# Patient Record
Sex: FEMALE | Race: WHITE | NOT HISPANIC OR LATINO | Employment: PART TIME | URBAN - NONMETROPOLITAN AREA
[De-identification: names, ages, dates, MRNs, and addresses within clinical notes are randomized per-mention and may not be internally consistent; named-entity substitution may affect disease eponyms.]

---

## 2017-02-15 ENCOUNTER — TRANSFERRED RECORDS (OUTPATIENT)
Dept: HEALTH INFORMATION MANAGEMENT | Facility: HOSPITAL | Age: 32
End: 2017-02-15

## 2017-02-22 ENCOUNTER — OFFICE VISIT (OUTPATIENT)
Dept: SLEEP MEDICINE | Facility: HOSPITAL | Age: 32
End: 2017-02-22
Attending: INTERNAL MEDICINE
Payer: COMMERCIAL

## 2017-02-22 VITALS
OXYGEN SATURATION: 99 % | SYSTOLIC BLOOD PRESSURE: 138 MMHG | DIASTOLIC BLOOD PRESSURE: 90 MMHG | WEIGHT: 160 LBS | BODY MASS INDEX: 28.35 KG/M2 | HEART RATE: 78 BPM | RESPIRATION RATE: 16 BRPM | HEIGHT: 63 IN

## 2017-02-22 DIAGNOSIS — G47.33 SLEEP APNEA, OBSTRUCTIVE: Primary | ICD-10-CM

## 2017-02-22 PROCEDURE — 99213 OFFICE O/P EST LOW 20 MIN: CPT

## 2017-02-22 PROCEDURE — 99241 ZZC OFFICE CONSULTATION,LEVEL I: CPT | Performed by: INTERNAL MEDICINE

## 2017-02-22 ASSESSMENT — ENCOUNTER SYMPTOMS
INSOMNIA: 1
HEADACHES: 0

## 2017-02-22 NOTE — MR AVS SNAPSHOT
After Visit Summary   2/22/2017    Eugenie Aguero    MRN: [id number]           Patient Information     Date Of Birth          1985        Visit Information        Provider Department      2/22/2017 1:30 PM Earl Hayes MD [facility]        Care Instructions    Overnight Sleep Study    In order to help your stay at the Sleep Center to be as comfortable as possible and to obtain the best sleep study possible, the Sleep Center Staff has established the following guidelines:    1.  Please attempt to be here 15 minutes prior to your scheduled appointment time.  If you anticipate being late or you cannot make your overnight appointment, please call us at [phone] or call [phone] and ask for the Sleep Center.  PLEASE MAKE EVERY ATTEMPT TO MAKE YOUR APPOINTMENT.  A SLEEP TECHNICIAN AND A SLEEP ROOM HAS BEEN RESERVED JUST FOR YOU.    2. When you arrive at [facility], please park in the upper lot, by [address].  Enter the hospital at the Emergency Room Entrance.  Follow the signs to the Emergency Room Admitting Desk and tell them you are here for a sleep study in the Sleep Disorder Center.    3. Do not stop any medications, unless specifically requested.  Be sure to bring all medications that you need with you.    4. Do not use any hair creams or gels, moisturizers, rinses or sprays the day of your study.  FOR MALES:  if you are usually clean shaven, please shave before you come in; FOR FEMALES:  do not wear make-up or be prepared to remove it.  This will improve the quality of the study.    5. Please DO NOT USE CAFFEINE OR ALCOHOL after 2:00 PM the day of your test unless advised otherwise.    6. Do not take any naps the day of your test.    7. Attachment of monitoring equipment will take approximately one hour, including an explanation of the test.    8. Bring comfortable night clothes to sleep in, two-piece, cotton pajamas or shorts are best.    9. If you have a pillow  "that you prefer using, please bring it with you; but do not forget to take it home with you in the morning.    10. Most of our studies are complete by approximately 7:00 AM.  In most instances we may wake you during your normal wake time or the time you request to be awakened.    If you have any special needs or questions related to this information or your test, please feel free to call the Sleep Disorder Center at [phone] or [phone] and ask for the Sleep Disorder Center.  Please make every effort to keep your appointment.  A sleep technician and a sleep room have been reserved just for you.  In the event that you are unable to make your appointment, please call as soon as possible to notify us of your cancellation.        Follow-ups after your visit        Who to contact     If you have questions or need follow up information about today's clinic visit or your schedule please contact [facility] directly at [phone].  Normal or non-critical lab and imaging results will be communicated to you by [employer], letter or phone within 4 business days after the clinic has received the results. If you do not hear from us within 7 days, please contact the clinic through [employer] or phone. If you have a critical or abnormal lab result, we will notify you by phone as soon as possible.  Submit refill requests through [employer] or call your pharmacy and they will forward the refill request to us. Please allow 3 business days for your refill to be completed.          Additional Information About Your Visit        [employer] Information     [employer] lets you send messages to your doctor, view your test results, renew your prescriptions, schedule appointments and more. To sign up, go to [email or web address] . Click on \"Log in\" on the left side of the screen, which will take you to the Welcome page. Then click on \"Sign up Now\" on the right side of the page.     You will be asked to enter the access code listed below, as well as " "some personal information. Please follow the directions to create your username and password.     Your access code is: [email or web address]  Expires: 2017  2:06 PM     Your access code will  in 90 days. If you need help or a new code, please call your [place] clinic or [phone].        Care EveryWhere ID     This is your Care EveryWhere ID. This could be used by other organizations to access your [place] medical records  [id number]        Your Vitals Were     Pulse Respirations Height Pulse Oximetry BMI (Body Mass Index)       78 16 5' 3\" (1.6 m) 99% 28.34 kg/m2        Blood Pressure from Last 3 Encounters:   17 138/90   16 121/85   11/04/15 111/80    Weight from Last 3 Encounters:   17 160 lb (72.6 kg)   11/04/15 182 lb 12.8 oz (82.9 kg)   11/03/15 184 lb (83.5 kg)              Today, you had the following     No orders found for display       Primary Care Provider Office Phone # Fax #    Tiarra DIGNA Rao [phone]       [facility] [place] [address] [facility] [address]        Thank you!     Thank you for choosing [facility]  for your care. Our goal is always to provide you with excellent care. Hearing back from our patients is one way we can continue to improve our services. Please take a few minutes to complete the written survey that you may receive in the mail after your visit with us. Thank you!             Your Updated Medication List - Protect others around you: Learn how to safely use, store and throw away your medicines at www.disposemymeds.org.          This list is accurate as of: 17  2:06 PM.  Always use your most recent med list.                   Brand Name Dispense Instructions for use    NAPROXEN PO      Take 500 mg by mouth 2 times daily as needed for moderate pain       ZOLOFT PO      Take 100 mg by mouth daily         "

## 2017-02-22 NOTE — PROGRESS NOTES
Roomed patient, verified ID by name and .  Took vitals and brief history.  Reviewed medications, allergies and smoking history.  Explained overnight testing procedure with patient having a good understanding.

## 2017-02-22 NOTE — PROGRESS NOTES
"HPI Comments: 30 y/o referred by Tiarra Rao with [facility]. Pts BF says she snores loudly and regularly quits breathing at night, she does snore herself awake. Sleep hours are 11 to 9, wakes frequently. No AM HAs, no RLS. She is quite sleepy during the day but tries not to nap. Weight is modestly up. Has trouble with chronic sinus congestion.    PMH migraine HA, allergic/perennial rhinitis    SH recently left a job, has a BF        Review of Systems   Constitutional: Positive for malaise/fatigue.   Neurological: Negative for headaches.   Psychiatric/Behavioral: The patient has insomnia.          Physical Exam   Constitutional: She is oriented to person, place, and time and well-developed, well-nourished, and in no distress.   HENT:   Head: Normocephalic.   Mouth/Throat: Oropharynx is clear and moist.   Narrowed pharynx, full ant neck   Cardiovascular: Normal rate.    Pulmonary/Chest: Effort normal.   Neurological: She is alert and oriented to person, place, and time. Gait normal.   Skin: Skin is warm and dry.   Psychiatric: Memory, affect and judgment normal.     /90  Pulse 78  Resp 16  Ht 5' 3\" (1.6 m)  Wt 160 lb (72.6 kg)  SpO2 99%  BMI 28.34 kg/m2     Current Outpatient Prescriptions:      Sertraline HCl (ZOLOFT PO), Take 100 mg by mouth daily, Disp: , Rfl:      NAPROXEN PO, Take 500 mg by mouth 2 times daily as needed for moderate pain, Disp: , Rfl:      A/ [facility]  Study ordered.      "

## 2017-02-22 NOTE — PATIENT INSTRUCTIONS
Overnight Sleep Study    In order to help your stay at the Sleep Center to be as comfortable as possible and to obtain the best sleep study possible, the Sleep Center Staff has established the following guidelines:    1.  Please attempt to be here 15 minutes prior to your scheduled appointment time.  If you anticipate being late or you cannot make your overnight appointment, please call us at [phone] or call [phone] and ask for the Sleep Center.  PLEASE MAKE EVERY ATTEMPT TO MAKE YOUR APPOINTMENT.  A SLEEP TECHNICIAN AND A SLEEP ROOM HAS BEEN RESERVED JUST FOR YOU.    2. When you arrive at [facility], please park in the upper lot, by [address].  Enter the hospital at the Emergency Room Entrance.  Follow the signs to the Emergency Room Admitting Desk and tell them you are here for a sleep study in the Sleep Disorder Center.    3. Do not stop any medications, unless specifically requested.  Be sure to bring all medications that you need with you.    4. Do not use any hair creams or gels, moisturizers, rinses or sprays the day of your study.  FOR MALES:  if you are usually clean shaven, please shave before you come in; FOR FEMALES:  do not wear make-up or be prepared to remove it.  This will improve the quality of the study.    5. Please DO NOT USE CAFFEINE OR ALCOHOL after 2:00 PM the day of your test unless advised otherwise.    6. Do not take any naps the day of your test.    7. Attachment of monitoring equipment will take approximately one hour, including an explanation of the test.    8. Bring comfortable night clothes to sleep in, two-piece, cotton pajamas or shorts are best.    9. If you have a pillow that you prefer using, please bring it with you; but do not forget to take it home with you in the morning.    10. Most of our studies are complete by approximately 7:00 AM.  In most instances we may wake you during your normal wake time or the time you request to be awakened.    If you have  any special needs or questions related to this information or your test, please feel free to call the Sleep Disorder Center at [phone] or [phone] and ask for the Sleep Disorder Center.  Please make every effort to keep your appointment.  A sleep technician and a sleep room have been reserved just for you.  In the event that you are unable to make your appointment, please call as soon as possible to notify us of your cancellation.

## 2017-02-25 ENCOUNTER — HOSPITAL ENCOUNTER (EMERGENCY)
Facility: HOSPITAL | Age: 32
Discharge: HOME | End: 2017-02-25
Attending: FAMILY MEDICINE | Admitting: FAMILY MEDICINE
Payer: COMMERCIAL

## 2017-02-25 VITALS
OXYGEN SATURATION: 97 % | HEART RATE: 99 BPM | TEMPERATURE: 98.1 F | SYSTOLIC BLOOD PRESSURE: 134 MMHG | DIASTOLIC BLOOD PRESSURE: 109 MMHG | RESPIRATION RATE: 16 BRPM

## 2017-02-25 DIAGNOSIS — G51.0 BELL'S PALSY: ICD-10-CM

## 2017-02-25 LAB
ALBUMIN SERPL-MCNC: 3.6 G/DL (ref 3.4–5)
ALP SERPL-CCNC: 99 U/L (ref 40–150)
ALT SERPL W P-5'-P-CCNC: 37 U/L (ref 0–50)
ANION GAP SERPL CALCULATED.3IONS-SCNC: 8 MMOL/L (ref 3–14)
AST SERPL W P-5'-P-CCNC: 16 U/L (ref 0–45)
BASOPHILS # BLD AUTO: 0 10E9/L (ref 0–0.2)
BASOPHILS NFR BLD AUTO: 0.5 %
BILIRUB SERPL-MCNC: 0.3 MG/DL (ref 0.2–1.3)
BUN SERPL-MCNC: 15 MG/DL (ref 7–30)
CALCIUM SERPL-MCNC: 8.9 MG/DL (ref 8.5–10.1)
CHLORIDE SERPL-SCNC: 105 MMOL/L (ref 94–109)
CO2 SERPL-SCNC: 27 MMOL/L (ref 20–32)
CREAT SERPL-MCNC: 0.81 MG/DL (ref 0.52–1.04)
DIFFERENTIAL METHOD BLD: NORMAL
EOSINOPHIL # BLD AUTO: 0.6 10E9/L (ref 0–0.7)
EOSINOPHIL NFR BLD AUTO: 6.7 %
ERYTHROCYTE [DISTWIDTH] IN BLOOD BY AUTOMATED COUNT: 13.3 % (ref 10–15)
ERYTHROCYTE [SEDIMENTATION RATE] IN BLOOD BY WESTERGREN METHOD: 21 MM/H (ref 0–20)
GFR SERPL CREATININE-BSD FRML MDRD: 83 ML/MIN/1.7M2
GLUCOSE SERPL-MCNC: 94 MG/DL (ref 70–99)
HCT VFR BLD AUTO: 39 % (ref 35–47)
HGB BLD-MCNC: 13.3 G/DL (ref 11.7–15.7)
IMM GRANULOCYTES # BLD: 0 10E9/L (ref 0–0.4)
IMM GRANULOCYTES NFR BLD: 0.2 %
LYMPHOCYTES # BLD AUTO: 2.9 10E9/L (ref 0.8–5.3)
LYMPHOCYTES NFR BLD AUTO: 34.9 %
MCH RBC QN AUTO: 30.2 PG (ref 26.5–33)
MCHC RBC AUTO-ENTMCNC: 34.1 G/DL (ref 31.5–36.5)
MCV RBC AUTO: 89 FL (ref 78–100)
MONOCYTES # BLD AUTO: 0.6 10E9/L (ref 0–1.3)
MONOCYTES NFR BLD AUTO: 6.8 %
NEUTROPHILS # BLD AUTO: 4.2 10E9/L (ref 1.6–8.3)
NEUTROPHILS NFR BLD AUTO: 50.9 %
NRBC # BLD AUTO: 0 10*3/UL
NRBC BLD AUTO-RTO: 0 /100
PLATELET # BLD AUTO: 248 10E9/L (ref 150–450)
POTASSIUM SERPL-SCNC: 3.8 MMOL/L (ref 3.4–5.3)
PROT SERPL-MCNC: 7.3 G/DL (ref 6.8–8.8)
RBC # BLD AUTO: 4.4 10E12/L (ref 3.8–5.2)
SODIUM SERPL-SCNC: 140 MMOL/L (ref 133–144)
WBC # BLD AUTO: 8.2 10E9/L (ref 4–11)

## 2017-02-25 PROCEDURE — 80053 COMPREHEN METABOLIC PANEL: CPT | Performed by: FAMILY MEDICINE

## 2017-02-25 PROCEDURE — 85025 COMPLETE CBC W/AUTO DIFF WBC: CPT | Performed by: FAMILY MEDICINE

## 2017-02-25 PROCEDURE — 99283 EMERGENCY DEPT VISIT LOW MDM: CPT

## 2017-02-25 PROCEDURE — 36415 COLL VENOUS BLD VENIPUNCTURE: CPT | Performed by: FAMILY MEDICINE

## 2017-02-25 PROCEDURE — 99283 EMERGENCY DEPT VISIT LOW MDM: CPT | Performed by: FAMILY MEDICINE

## 2017-02-25 PROCEDURE — 85652 RBC SED RATE AUTOMATED: CPT | Performed by: FAMILY MEDICINE

## 2017-02-25 RX ORDER — ETHYL ALCOHOL 700 MG/ML
0.25 GEL TOPICAL 3 TIMES DAILY PRN
Qty: 1 TUBE | Refills: 0 | Status: SHIPPED | OUTPATIENT
Start: 2017-02-25 | End: 2017-03-02

## 2017-02-25 RX ORDER — PREDNISONE 20 MG/1
TABLET ORAL
Qty: 9 TABLET | Refills: 0 | Status: SHIPPED
Start: 2017-02-25 | End: 2018-12-09

## 2017-02-25 RX ORDER — VALACYCLOVIR HYDROCHLORIDE 1 G/1
500 TABLET, FILM COATED ORAL 3 TIMES DAILY
Qty: 8 TABLET | Refills: 0 | Status: SHIPPED | OUTPATIENT
Start: 2017-02-25 | End: 2018-12-09

## 2017-02-25 NOTE — ED AVS SNAPSHOT
[facility]    [address]    [place] [address]    Phone:  [phone]                                       Eugenie Aguero   MRN: [id number]    Department:  [facility]   Date of Visit:  2/25/2017           After Visit Summary Signature Page     I have received my discharge instructions, and my questions have been answered. I have discussed any challenges I see with this plan with the nurse or doctor.    ..........................................................................................................................................  Patient/Patient Representative Signature      ..........................................................................................................................................  Patient Representative Print Name and Relationship to Patient    ..................................................               ................................................  Date                                            Time    ..........................................................................................................................................  Reviewed by Signature/Title    ...................................................              ..............................................  Date                                                            Time

## 2017-02-25 NOTE — DISCHARGE INSTRUCTIONS
Tan s Palsy  Bell's Palsy is a problem involving the nerve that controls the muscles on one side of the face.    The cause is unknown, but may be related to inflammation of the nerve. Symptoms usually appear only on the affected side. They may include:    Inability to close the eyelid    Tearing of the eye    Facial drooping    Drooling    Numbness or pain    Changes in taste    Sound sensitivity  Damage to the eye can be a serious problem. The inability to blink can cause the eye to dry out. An ulcer (sore) can then form on the cornea. Also, not blinking means that the eye has no protection from dirt and dust particles.   Treatment involves protecting and moistening the eye. Medicines may also help.  Most persons recover completely within 3 to 6 months. However, the condition sometimes returns months or years later.  Home care    Get plenty of rest and eat a healthy diet to help yourself recover.    Use Artificial Tears frequently during the day and at bedtime to prevent drying. These drops are available without prescription at your drug store.    Wear protective glasses especially when outside to protect from flying debris. Use sunglasses when outdoors.    Tape the eyelid closed at bedtime with a paper tape (available at your pharmacy). This has a very mild adhesive to avoid injury to the lid. This will protect your eye from injury while you sleep.    Sometimes medicines are prescribed to reduce inflammation or treat specific viral infections of the nerve. If medicines are prescribed, take them exactly as directed.  Usually there is a 10-day course of medication that is started as soon as possible.  Taking this medicine as prescribed will help with a full recovery.      Use low heat, for example from a heating pad, on the affected area.  This can help reduce pain and swelling.      If you are experiencing sever pain, contact your health care provider.  Follow-up care  Follow up with your healthcare provider as  advised. If you referred to a specialist, make that appointment promptly.  When to seek medical advice  Call your healthcare provider if any of the following occur:    Severe eye redness    Eye pain    Thick drainage from the eye    Change in vision (such as double vision or losing vision)    Fever over 100.4 F (38 C) or as directed by your healthcare provider    Headache, neck pain, weakness, trouble speaking or walking, or other unexplained symptoms    2263-0532 [employer]. [address] [place] [address], [place] [address]. All rights reserved. This information is not intended as a substitute for professional medical care. Always follow your healthcare professional's instructions.

## 2017-02-25 NOTE — ED NOTES
"Pt ambulatory to room 1 of the ED. Pt has right sided facial weakness since today and right eye doesn't close all of the way and is dry. A few days ago the right side of tongue felt numb and \"burnt.\" Right ear has been sound sensitive. Left side of mouth not working correctly. Pt talking appropriately. Pt a/o. Denies pain. Pt gowned. Call light in reach.  "

## 2017-02-25 NOTE — ED NOTES
Spoke with TODD Mason regarding patient's status. Patient states last night she felt like the right side of her face was weaker, she was unable to fully shut R eye which required eye drops throughout the night. FAST in triage positive for R sided droop. No other neuro symptoms - no slurred speech. Alert and oriented - able to lift eyebrows. All extremities move. Patient denies pain, fever, chills or other complaints.

## 2017-02-25 NOTE — ED AVS SNAPSHOT
[facility]    [address] [place] [address]    Phone:  [phone]                                       Eugenie gAuero   MRN: [id number]    Department:  [facility]   Date of Visit:  2/25/2017           Patient Information     Date Of Birth          1985        Your diagnoses for this visit were:     Bell's palsy        You were seen by Joelle Lundberg MD.      Follow-up Information     Follow up with Tiarra Rao NP In 1 week.    Contact information:    [facility] [place]  [address] [place] [address]  [phone]          Discharge Instructions         Bell s Palsy  Bell's Palsy is a problem involving the nerve that controls the muscles on one side of the face.    The cause is unknown, but may be related to inflammation of the nerve. Symptoms usually appear only on the affected side. They may include:    Inability to close the eyelid    Tearing of the eye    Facial drooping    Drooling    Numbness or pain    Changes in taste    Sound sensitivity  Damage to the eye can be a serious problem. The inability to blink can cause the eye to dry out. An ulcer (sore) can then form on the cornea. Also, not blinking means that the eye has no protection from dirt and dust particles.   Treatment involves protecting and moistening the eye. Medicines may also help.  Most persons recover completely within 3 to 6 months. However, the condition sometimes returns months or years later.  Home care    Get plenty of rest and eat a healthy diet to help yourself recover.    Use Artificial Tears frequently during the day and at bedtime to prevent drying. These drops are available without prescription at your drug store.    Wear protective glasses especially when outside to protect from flying debris. Use sunglasses when outdoors.    Tape the eyelid closed at bedtime with a paper tape (available at your pharmacy). This has a very mild adhesive to avoid injury to the lid. This will  protect your eye from injury while you sleep.    Sometimes medicines are prescribed to reduce inflammation or treat specific viral infections of the nerve. If medicines are prescribed, take them exactly as directed.  Usually there is a 10-day course of medication that is started as soon as possible.  Taking this medicine as prescribed will help with a full recovery.      Use low heat, for example from a heating pad, on the affected area.  This can help reduce pain and swelling.      If you are experiencing sever pain, contact your health care provider.  Follow-up care  Follow up with your healthcare provider as advised. If you referred to a specialist, make that appointment promptly.  When to seek medical advice  Call your healthcare provider if any of the following occur:    Severe eye redness    Eye pain    Thick drainage from the eye    Change in vision (such as double vision or losing vision)    Fever over 100.4 F (38 C) or as directed by your healthcare provider    Headache, neck pain, weakness, trouble speaking or walking, or other unexplained symptoms    7371-8476 [employer]. [address] [place] [address]. All rights reserved. This information is not intended as a substitute for professional medical care. Always follow your healthcare professional's instructions.          Future Appointments        Provider Department Dept Phone Center    3/13/2017 8:30 PM [facility] [phone] [facility]         Review of your medicines      START taking        Dose / Directions Last dose taken    EYE LUBRICANT Oint   Dose:  0.25 inch   Quantity:  1 Tube        Apply 0.25 inches to eye 3 times daily as needed   Refills:  0        predniSONE 20 MG tablet   Commonly known as:  DELTASONE   Quantity:  9 tablet        Take 4 tablets (40 mg) twice a day for 5 days, then 2 tablets (20 mg) twice a day for 2 days, then 1 tablet (10 mg) a day for 2 days, then 1/2 tablet for 1 day,  then off   Refills:  0        valACYclovir 1000 mg tablet   Commonly known as:  VALTREX   Dose:  500 mg   Quantity:  8 tablet        Take 0.5 tablets (500 mg) by mouth 3 times daily for 5 days   Refills:  0          Our records show that you are taking the medicines listed below. If these are incorrect, please call your family doctor or clinic.        Dose / Directions Last dose taken    IBUPROFEN PO   Dose:  600 mg        Take 600 mg by mouth every 6 hours as needed for moderate pain   Refills:  0        IMITREX PO   Dose:  100 mg        Take 100 mg by mouth every 8 hours as needed for migraine   Refills:  0        LEXAPRO PO   Dose:  10 mg        Take 10 mg by mouth daily   Refills:  0        PROPRANOLOL HCL PO   Dose:  80 mg        Take 80 mg by mouth daily   Refills:  0                Prescriptions were sent or printed at these locations (3 Prescriptions)                   [address] [address]    Telephone:  [phone]   Fax:  [phone]   Hours:                  E-Prescribed (2 of 3)         valACYclovir (VALTREX) 1000 mg tablet               Artificial Tear Ointment (EYE LUBRICANT) OINT                 Faxed (1 of 3)         predniSONE (DELTASONE) 20 MG tablet                Procedures and tests performed during your visit     CBC with platelets differential    Comprehensive metabolic panel    Erythrocyte sedimentation rate auto      Orders Needing Specimen Collection     None      Pending Results     No orders found from 2/23/2017 to 2/26/2017.            Pending Culture Results     No orders found from 2/23/2017 to 2/26/2017.            Thank you for choosing [facility]       Thank you for choosing [facility] for your care. Our goal is always to provide you with excellent care. Hearing back from our patients is one way we can continue to improve our services. Please take a few minutes to  "complete the written survey that you may receive in the mail after you visit with us. Thank you!        [employer] Information     [employer] lets you send messages to your doctor, view your test results, renew your prescriptions, schedule appointments and more. To sign up, go to [email or web address] . Click on \"Log in\" on the left side of the screen, which will take you to the Welcome page. Then click on \"Sign up Now\" on the right side of the page.     You will be asked to enter the access code listed below, as well as some personal information. Please follow the directions to create your username and password.     Your access code is: [email or web address]  Expires: 2017  2:06 PM     Your access code will  in 90 days. If you need help or a new code, please call your [place] clinic or [phone].        Care EveryWhere ID     This is your Care EveryWhere ID. This could be used by other organizations to access your [place] medical records  [id number]        After Visit Summary       This is your record. Keep this with you and show to your community pharmacist(s) and doctor(s) at your next visit.                  "

## 2017-02-26 NOTE — ED PROVIDER NOTES
eMERGENCY dEPARTMENT eNCOUnter        CHIEF COMPLAINT    Chief Complaint   Patient presents with     Other     R sided facial weakness. FAST shows R sided droop. Patient states she is unable to close her eyes completely due to weakness. No other neuro symptoms, patient able to move all extremities, no slurred speech. Symptoms began last night       HPI    Eugenie Aguero is a 31 year old female who presents with a new onset of a facial droop localized on the right  side.  The onset was was yesterday  She noticed she had trouble closing her eye.  She put eye drops in it but these didn't seem to help.  A few days ago she had a plugging in her ear but this has resolved..  The patient has no associated hand  weakness.  Patient has no Significant localized pain.  There are no known aggravating or alleviating factors.  No ear pain, no weakness of any extremity.    REVIEW OF SYSTEMS    Neurologic: No LOC or stiff neck, See HPI  Cardiac: No Chest Pain, no syncope  Respiratory: No cough or difficulty breathing  GI: No Vomiting  General: No Fever  All other systems reviewed and are negative.    PAST MEDICAL & SURGICAL HISTORY    Past Medical History   Diagnosis Date     Anxiety      Bilateral carpal tunnel syndrome 11/3/2015     Depressive disorder      Past Surgical History   Procedure Laterality Date     Release carpal tunnel Right 11/4/2015     Procedure: RELEASE CARPAL TUNNEL;  Surgeon: Keith Dove MD;  Location: [facility]       CURRENT MEDICATIONS    Current Outpatient Rx   Medication Sig Dispense Refill     IBUPROFEN PO Take 600 mg by mouth every 6 hours as needed for moderate pain       Escitalopram Oxalate (LEXAPRO PO) Take 10 mg by mouth daily       PROPRANOLOL HCL PO Take 80 mg by mouth daily       SUMAtriptan Succinate (IMITREX PO) Take 100 mg by mouth every 8 hours as needed for migraine       predniSONE (DELTASONE) 20 MG tablet Take 4 tablets (40 mg) twice a day for 5 days, then 2 tablets (20 mg) twice a  day for 2 days, then 1 tablet (10 mg) a day for 2 days, then 1/2 tablet for 1 day, then off 9 tablet 0     valACYclovir (VALTREX) 1000 mg tablet Take 0.5 tablets (500 mg) by mouth 3 times daily for 5 days 8 tablet 0     Artificial Tear Ointment (EYE LUBRICANT) OINT Apply 0.25 inches to eye 3 times daily as needed 1 Tube 0       ALLERGIES    Allergies   Allergen Reactions     Sulfa Drugs        SOCIAL & FAMILY HISTORY    Social History     Social History     Marital status: Single     Spouse name: N/A     Number of children: N/A     Years of education: N/A     Social History Main Topics     Smoking status: Current Every Day Smoker     Packs/day: 0.50     Years: 10.00     Smokeless tobacco: None     Alcohol use No     Drug use: No     Sexual activity: Not Asked     Other Topics Concern     None     Social History Narrative     No family history on file.    PHYSICAL EXAM    VITAL SIGNS: BP (!) 134/109  Pulse 99  Temp 98.1  F (36.7  C) (Oral)  Resp 16  SpO2 97%  Constitutional:  Well developed, well nourished, no acute distress   Eyes: Pupils equally round and react to light, extraocular movement are intact  HENT:  Atraumatic, moist mucus membranes, airway patent, right facial droop involving upper and lower face, having trouble closing her right eye, diminished taste right tongue.  Normal tongue strength.  Neck: supple, no JVD   Respiratory:  Lungs Clear, no retractions   Cardiovascular:  Reg rate, no murmurs  GI:  Soft, nontender, normal bowel sounds  Musculoskeletal:  No edema, no acute deformities  Integument:  Well hydrated, no petechiae   Neurologic:  Alert & oriented, no slurred speech, Hand  5 over 5 motor, normal finger to nose test bilaterally, patellar reflexes equal bilaterally  Psych: Pleasant affect, no hallucinations    ED COURSE & MEDICAL DECISION MAKING    Pertinent Labs & Imaging studies reviewed and interpreted. (See chart for details)    Vitals:    02/25/17 1302 02/25/17 1304 02/25/17 1521   BP:   136/98 (!) 134/109   Pulse:   99   Resp:  18 16   Temp: 98.2  F (36.8  C)  98.1  F (36.7  C)   TempSrc: Oral  Oral   SpO2:  99% 97%         FINAL IMPRESSION    Acute Bell's Palsy      PLAN: Outpatient follow-up, oral steroids, oral antiviral medication, natural tears.  She will make appointment with Fariba Rao for next week for followup.  Information provided.         Joelle Lundberg MD  02/26/17 1553

## 2017-02-27 NOTE — PROGRESS NOTES
TC from [facility] requesting prescription be re-faxed.  Unable to at this time, prednisone prescription called in per pharmacist request, other 2 Rx from encounter received via fax.

## 2018-12-09 ENCOUNTER — HOSPITAL ENCOUNTER (EMERGENCY)
Facility: HOSPITAL | Age: 33
Discharge: HOME | End: 2018-12-09
Attending: PHYSICIAN ASSISTANT | Admitting: PHYSICIAN ASSISTANT
Payer: COMMERCIAL

## 2018-12-09 VITALS
OXYGEN SATURATION: 99 % | HEART RATE: 128 BPM | RESPIRATION RATE: 16 BRPM | TEMPERATURE: 97.2 F | DIASTOLIC BLOOD PRESSURE: 109 MMHG | SYSTOLIC BLOOD PRESSURE: 156 MMHG

## 2018-12-09 DIAGNOSIS — J02.0 STREPTOCOCCAL PHARYNGITIS: ICD-10-CM

## 2018-12-09 DIAGNOSIS — E86.0 DEHYDRATION: ICD-10-CM

## 2018-12-09 LAB
DEPRECATED S PYO AG THROAT QL EIA: ABNORMAL
SPECIMEN SOURCE: ABNORMAL

## 2018-12-09 PROCEDURE — G0463 HOSPITAL OUTPT CLINIC VISIT: HCPCS

## 2018-12-09 PROCEDURE — 87880 STREP A ASSAY W/OPTIC: CPT | Performed by: FAMILY MEDICINE

## 2018-12-09 PROCEDURE — 99213 OFFICE O/P EST LOW 20 MIN: CPT | Performed by: PHYSICIAN ASSISTANT

## 2018-12-09 RX ORDER — AMOXICILLIN 500 MG/1
500 CAPSULE ORAL 3 TIMES DAILY
Qty: 30 CAPSULE | Refills: 0 | Status: SHIPPED | OUTPATIENT
Start: 2018-12-09 | End: 2018-12-19

## 2018-12-09 RX ORDER — PREDNISONE 20 MG/1
TABLET ORAL
Qty: 10 TABLET | Refills: 0 | Status: SHIPPED | OUTPATIENT
Start: 2018-12-09 | End: 2018-12-16

## 2018-12-09 ASSESSMENT — ENCOUNTER SYMPTOMS
NECK STIFFNESS: 0
HEADACHES: 0
SORE THROAT: 1
COUGH: 0
APPETITE CHANGE: 1
NAUSEA: 0
NECK PAIN: 0
DIZZINESS: 0
ABDOMINAL PAIN: 0
CARDIOVASCULAR NEGATIVE: 1
VOICE CHANGE: 0
PSYCHIATRIC NEGATIVE: 1
FEVER: 0
MYALGIAS: 1
EYE REDNESS: 0
LIGHT-HEADEDNESS: 0
EYE DISCHARGE: 0
FATIGUE: 1
SINUS PRESSURE: 0
DIARRHEA: 0
TROUBLE SWALLOWING: 0
VOMITING: 0

## 2018-12-09 NOTE — ED PROVIDER NOTES
History     Chief Complaint   Patient presents with     Pharyngitis     The history is provided by the patient. No  was used.     Eugenie Aguero is a 33 year old female who has 3 days of sore throat, decreased energy/appetite, body aches and left ear pain. No n/v/d/f/c. No change in b/b habits. No new rash    Problem List:    Patient Active Problem List    Diagnosis Date Noted     Anxiety 11/03/2015     Priority: Medium     Major depressive disorder, recurrent episode, moderate (H) 11/03/2015     Priority: Medium     Bilateral carpal tunnel syndrome 11/03/2015     Priority: Medium        Past Medical History:    Past Medical History:   Diagnosis Date     Anxiety      Bilateral carpal tunnel syndrome 11/3/2015     Depressive disorder        Past Surgical History:    Past Surgical History:   Procedure Laterality Date     RELEASE CARPAL TUNNEL Right 11/4/2015    Procedure: RELEASE CARPAL TUNNEL;  Surgeon: Keith Dove MD;  Location: [facility]       Family History:    No family history on file.    Social History:  Marital Status:  Single [1]  Social History     Tobacco Use     Smoking status: Current Every Day Smoker     Packs/day: 0.50     Years: 10.00     Pack years: 5.00   Substance Use Topics     Alcohol use: No     Drug use: No        Medications:      amoxicillin (AMOXIL) 500 MG capsule   Escitalopram Oxalate (LEXAPRO PO)   predniSONE (DELTASONE) 20 MG tablet   PROPRANOLOL HCL PO   SUMAtriptan Succinate (IMITREX PO)   IBUPROFEN PO         Review of Systems   Constitutional: Positive for appetite change and fatigue. Negative for fever.   HENT: Positive for ear pain and sore throat. Negative for congestion, sinus pressure, trouble swallowing and voice change.    Eyes: Negative for discharge and redness.   Respiratory: Negative for cough.    Cardiovascular: Negative.    Gastrointestinal: Negative for abdominal pain, diarrhea, nausea and vomiting.   Genitourinary: Negative.    Musculoskeletal:  Positive for myalgias. Negative for neck pain and neck stiffness.   Skin: Negative for rash.   Neurological: Negative for dizziness, light-headedness and headaches.   Psychiatric/Behavioral: Negative.        Physical Exam   BP: (!) 156/109  Pulse: (!) 128  Temp: 97.2  F (36.2  C)  Resp: 16  SpO2: 99 %      Physical Exam   Constitutional: She is oriented to person, place, and time. She appears well-developed and well-nourished. No distress.   HENT:   Head: Normocephalic and atraumatic.   Bilateral TMs/canals clear/wnl  No sinus TTP    Oropharynx has mild erythema/edema. No exudate     Eyes: Conjunctivae and EOM are normal. Right eye exhibits no discharge. Left eye exhibits no discharge.   Neck: Normal range of motion. Neck supple.   Cardiovascular: Normal rate, regular rhythm and normal heart sounds.   Pulmonary/Chest: Effort normal and breath sounds normal. No respiratory distress.   Abdominal: Soft. Bowel sounds are normal. She exhibits no distension.   Neurological: She is alert and oriented to person, place, and time.   Skin: Skin is warm and dry. She is not diaphoretic.   Psychiatric: She has a normal mood and affect.   Nursing note and vitals reviewed.      ED Course        Procedures               Results for orders placed or performed during the hospital encounter of 12/09/18 (from the past 24 hour(s))   Rapid strep screen   Result Value Ref Range    Specimen Description Throat     Rapid Strep A Screen (A)      POSITIVE: Group A Streptococcal antigen detected by immunoassay.       Medications - No data to display    Assessments & Plan (with Medical Decision Making)     I have reviewed the nursing notes.    I have reviewed the findings, diagnosis, plan and need for follow up with the patient.         Review of your medicines      UNREVIEWED medicines. Ask your doctor about these medicines      Dose / Directions   IBUPROFEN PO      Dose:  600 mg  Take 600 mg by mouth every 6 hours as needed for moderate  pain  Refills:  0     IMITREX PO      Dose:  100 mg  Take 100 mg by mouth every 8 hours as needed for migraine  Refills:  0     LEXAPRO PO      Dose:  10 mg  Take 10 mg by mouth daily  Refills:  0     PROPRANOLOL HCL PO      Dose:  80 mg  Take 80 mg by mouth daily  Refills:  0        START taking      Dose / Directions   amoxicillin 500 MG capsule  Commonly known as:  AMOXIL      Dose:  500 mg  Take 1 capsule (500 mg) by mouth 3 times daily for 10 days  Quantity:  30 capsule  Refills:  0     predniSONE 20 MG tablet  Commonly known as:  DELTASONE      Take two tablets (= 40mg) each day for 5 (five) days.  Quantity:  10 tablet  Refills:  0           Where to get your medicines      These medications were sent to [id number] - [place], [place] - [address]   [address], [place] [address]    Phone:  [phone]     amoxicillin 500 MG capsule    predniSONE 20 MG tablet         Final diagnoses:   Streptococcal pharyngitis   Dehydration           Patient verbally educated and given appropriate education sheets for each of the diagnoses and has no questions.  Take OTC motrin or tylenol as directed on the bottle as needed.  Take prescription medications as directed.  Increase fluids, wash hands often.  Sleep in a recliner or with multiple pillows until this has resolved.  Follow up with your provider if symptoms increase or if further concerns develop, return to the ER.  Kesha Spencer Certified   Physician Assistant  12/9/2018  1:36 PM  URGENT CARE CLINIC    12/9/2018   [facility]     Kesha Spencer PA  12/09/18 8053

## 2018-12-09 NOTE — ED TRIAGE NOTES
Pt presents today with c/o possible strep. Swollen throat, and pain while swallowing. States she has left ear pain and body aches as well. Started 3 days ago.

## 2018-12-09 NOTE — ED AVS SNAPSHOT
[facility]  [address]  [place] [address]  Phone:  [phone]                                    Eugenie Aguero   MRN: [id number]    Department:  [facility]   Date of Visit:  12/9/2018           After Visit Summary Signature Page    I have received my discharge instructions, and my questions have been answered. I have discussed any challenges I see with this plan with the nurse or doctor.    ..........................................................................................................................................  Patient/Patient Representative Signature      ..........................................................................................................................................  Patient Representative Print Name and Relationship to Patient    ..................................................               ................................................  Date                                   Time    ..........................................................................................................................................  Reviewed by Signature/Title    ...................................................              ..............................................  Date                                               Time          22EPIC Rev 08/18

## 2019-04-28 ENCOUNTER — HOSPITAL ENCOUNTER (EMERGENCY)
Facility: HOSPITAL | Age: 34
Discharge: LEFT BEFORE DISCHARGE | End: 2019-04-28
Attending: EMERGENCY MEDICINE | Admitting: EMERGENCY MEDICINE
Payer: COMMERCIAL

## 2019-04-28 VITALS — SYSTOLIC BLOOD PRESSURE: 145 MMHG | DIASTOLIC BLOOD PRESSURE: 106 MMHG | OXYGEN SATURATION: 100 % | TEMPERATURE: 97.4 F

## 2019-04-28 DIAGNOSIS — N94.6 DYSMENORRHEA: ICD-10-CM

## 2019-04-28 LAB
ALBUMIN UR-MCNC: 30 MG/DL
APPEARANCE UR: CLEAR
BACTERIA #/AREA URNS HPF: ABNORMAL /HPF
BILIRUB UR QL STRIP: NEGATIVE
COLOR UR AUTO: YELLOW
GLUCOSE UR STRIP-MCNC: NEGATIVE MG/DL
HGB UR QL STRIP: NEGATIVE
KETONES UR STRIP-MCNC: NEGATIVE MG/DL
LEUKOCYTE ESTERASE UR QL STRIP: NEGATIVE
MUCOUS THREADS #/AREA URNS LPF: PRESENT /LPF
NITRATE UR QL: POSITIVE
PH UR STRIP: 5.5 PH (ref 4.7–8)
RBC #/AREA URNS AUTO: <1 /HPF (ref 0–2)
SOURCE: ABNORMAL
SP GR UR STRIP: 1.03 (ref 1–1.03)
UROBILINOGEN UR STRIP-MCNC: NORMAL MG/DL (ref 0–2)
WBC #/AREA URNS AUTO: 4 /HPF (ref 0–5)

## 2019-04-28 PROCEDURE — 87086 URINE CULTURE/COLONY COUNT: CPT | Performed by: EMERGENCY MEDICINE

## 2019-04-28 PROCEDURE — 99284 EMERGENCY DEPT VISIT MOD MDM: CPT | Mod: 25

## 2019-04-28 PROCEDURE — 96372 THER/PROPH/DIAG INJ SC/IM: CPT

## 2019-04-28 PROCEDURE — 99284 EMERGENCY DEPT VISIT MOD MDM: CPT | Mod: Z6 | Performed by: EMERGENCY MEDICINE

## 2019-04-28 PROCEDURE — 87186 SC STD MICRODIL/AGAR DIL: CPT | Performed by: EMERGENCY MEDICINE

## 2019-04-28 PROCEDURE — 87088 URINE BACTERIA CULTURE: CPT | Performed by: EMERGENCY MEDICINE

## 2019-04-28 PROCEDURE — 25000132 ZZH RX MED GY IP 250 OP 250 PS 637: Performed by: EMERGENCY MEDICINE

## 2019-04-28 PROCEDURE — 81001 URINALYSIS AUTO W/SCOPE: CPT | Performed by: EMERGENCY MEDICINE

## 2019-04-28 PROCEDURE — 25000128 H RX IP 250 OP 636: Performed by: EMERGENCY MEDICINE

## 2019-04-28 RX ORDER — KETOROLAC TROMETHAMINE 30 MG/ML
30 INJECTION, SOLUTION INTRAMUSCULAR; INTRAVENOUS ONCE
Status: COMPLETED | OUTPATIENT
Start: 2019-04-28 | End: 2019-04-28

## 2019-04-28 RX ORDER — ACETAMINOPHEN 325 MG/1
1000 TABLET ORAL ONCE
Status: COMPLETED | OUTPATIENT
Start: 2019-04-28 | End: 2019-04-28

## 2019-04-28 RX ADMIN — KETOROLAC TROMETHAMINE 30 MG: 30 INJECTION INTRAMUSCULAR; INTRAVENOUS at 07:18

## 2019-04-28 RX ADMIN — ACETAMINOPHEN 975 MG: 325 TABLET, FILM COATED ORAL at 07:20

## 2019-04-28 ASSESSMENT — ENCOUNTER SYMPTOMS
BACK PAIN: 1
NAUSEA: 1
DIARRHEA: 0
CONSTITUTIONAL NEGATIVE: 1
ABDOMINAL PAIN: 1
VOMITING: 0

## 2019-04-28 NOTE — ED NOTES
Federica MCDERMOTT talked to patient earlier about getting a free taxi ride home.  Refused to sign Against Medical Advice form when talked about it by Federica MCDERMOTT. Was worried about having a ride home and refused to have a taxi ride (free) home.  Person who drove her here had a suspended license and was pulled over by the police.  Dr Liang notified about ride problems around 0725.  Proceeded to walk out of the ED.

## 2019-04-28 NOTE — ED PROVIDER NOTES
History     Chief Complaint   Patient presents with     Abdominal Cramping     abdominal cramping - menstrual period started Thurs     HPI  Eugenie Aguero is a 33 year old female who presents to the ER with lower abdominal pain and pelvic pain.  Patient attributes his pain to dysmenorrhea she states that she has pain with her menstrual cycles usually takes Naprosyn.  Unfortunately despite being compliant with doctor's recommendations she continues to have lower pelvic pain.  She denies dysuria states that she has a low-grade back pain but the majority the pain is periumbilical radiating downward.  Patient also endorses low-grade back pain.  Patient denies fevers or chills and denies other complaints.    Allergies:  Allergies   Allergen Reactions     Sulfa Drugs        Problem List:    Patient Active Problem List    Diagnosis Date Noted     Anxiety 11/03/2015     Priority: Medium     Major depressive disorder, recurrent episode, moderate (H) 11/03/2015     Priority: Medium     Bilateral carpal tunnel syndrome 11/03/2015     Priority: Medium        Past Medical History:    Past Medical History:   Diagnosis Date     Anxiety      Bilateral carpal tunnel syndrome 11/3/2015     Depressive disorder        Past Surgical History:    Past Surgical History:   Procedure Laterality Date     RELEASE CARPAL TUNNEL Right 11/4/2015    Procedure: RELEASE CARPAL TUNNEL;  Surgeon: Keith Dove MD;  Location: [facility]       Family History:    No family history on file.    Social History:  Marital Status:  Single [1]  Social History     Tobacco Use     Smoking status: Current Every Day Smoker     Packs/day: 0.50     Years: 10.00     Pack years: 5.00   Substance Use Topics     Alcohol use: No     Drug use: No        Medications:      Escitalopram Oxalate (LEXAPRO PO)   IBUPROFEN PO   PROPRANOLOL HCL PO   SUMAtriptan Succinate (IMITREX PO)         Review of Systems   Constitutional: Negative.    Gastrointestinal: Positive for  abdominal pain and nausea. Negative for diarrhea and vomiting.   Musculoskeletal: Positive for back pain.   All other systems reviewed and are negative.      Physical Exam   BP: (!) 171/117  Heart Rate: 115  Temp: 97.4  F (36.3  C)  SpO2: 100 %      Physical Exam   Constitutional: She appears well-developed and well-nourished.   Eyes: Pupils are equal, round, and reactive to light.   Neck: Normal range of motion.   Cardiovascular: Normal rate.   Abdominal: Soft. There is tenderness. There is no rebound and no guarding.   Skin: Skin is warm and dry.       ED Course        Procedures                 No results found for this or any previous visit (from the past 24 hour(s)).    Medications   ketorolac (TORADOL) injection 30 mg (has no administration in time range)   acetaminophen (TYLENOL) tablet 975 mg (has no administration in time range)       Assessments & Plan (with Medical Decision Making)     I have reviewed the nursing notes.    I have reviewed the findings, diagnosis, plan and need for follow up with the patient.         Medication List      There are no discharge medications for this visit.         Final diagnoses:   Dysmenorrhea   MDM  This is a pleasant patient presented to ER with lower pelvic pain and abdominal pain which she associates with her menstrual cycle.  Patient denies purulent vaginal discharge she denies dysuria however she does have a low-grade back pain.  Clinical history and exam is consistent with dysmenorrhea.  We will attempt to achieve pain management with intramuscular Toradol and acetaminophen.  Patient be signed out to my oncoming colleague please see their documentation for any change in the plan anticipate discharge with oral . pain medication as needed    Addendum:  Patient left AMA. Did not sign paperwork.         4/28/2019   [facility]     Terry Liang MD  04/28/19 0709       Terry Liang MD  04/28/19 0724

## 2019-04-30 LAB
BACTERIA SPEC CULT: ABNORMAL
SPECIMEN SOURCE: ABNORMAL

## 2019-04-30 RX ORDER — NITROFURANTOIN 25; 75 MG/1; MG/1
100 CAPSULE ORAL 2 TIMES DAILY
Qty: 14 CAPSULE | Refills: 0 | Status: SHIPPED | OUTPATIENT
Start: 2019-04-30 | End: 2019-08-22

## 2019-08-22 ENCOUNTER — OFFICE VISIT (OUTPATIENT)
Dept: FAMILY MEDICINE | Facility: OTHER | Age: 34
End: 2019-08-22
Attending: FAMILY MEDICINE
Payer: COMMERCIAL

## 2019-08-22 VITALS
SYSTOLIC BLOOD PRESSURE: 126 MMHG | OXYGEN SATURATION: 99 % | BODY MASS INDEX: 28.88 KG/M2 | DIASTOLIC BLOOD PRESSURE: 80 MMHG | HEART RATE: 115 BPM | WEIGHT: 163 LBS | HEIGHT: 63 IN | TEMPERATURE: 97 F

## 2019-08-22 DIAGNOSIS — N30.01 ACUTE CYSTITIS WITH HEMATURIA: ICD-10-CM

## 2019-08-22 DIAGNOSIS — R39.9 LOWER URINARY TRACT SYMPTOMS (LUTS): Primary | ICD-10-CM

## 2019-08-22 LAB
ALBUMIN UR-MCNC: 100 MG/DL
APPEARANCE UR: ABNORMAL
BACTERIA #/AREA URNS HPF: ABNORMAL /HPF
BILIRUB UR QL STRIP: NEGATIVE
COLOR UR AUTO: YELLOW
GLUCOSE UR STRIP-MCNC: NEGATIVE MG/DL
HGB UR QL STRIP: ABNORMAL
HYALINE CASTS #/AREA URNS LPF: 8 /LPF
KETONES UR STRIP-MCNC: NEGATIVE MG/DL
LEUKOCYTE ESTERASE UR QL STRIP: ABNORMAL
MUCOUS THREADS #/AREA URNS LPF: PRESENT /LPF
NITRATE UR QL: POSITIVE
PH UR STRIP: 6 PH (ref 4.7–8)
RBC #/AREA URNS AUTO: 34 /HPF (ref 0–2)
SOURCE: ABNORMAL
SP GR UR STRIP: 1.03 (ref 1–1.03)
SQUAMOUS #/AREA URNS AUTO: 10 /HPF (ref 0–1)
TRANS CELLS #/AREA URNS HPF: 3 /HPF (ref 0–1)
UROBILINOGEN UR STRIP-MCNC: NORMAL MG/DL (ref 0–2)
WBC #/AREA URNS AUTO: >182 /HPF (ref 0–5)
WBC CLUMPS #/AREA URNS HPF: PRESENT /HPF

## 2019-08-22 PROCEDURE — 87088 URINE BACTERIA CULTURE: CPT | Mod: ZL | Performed by: FAMILY MEDICINE

## 2019-08-22 PROCEDURE — 87086 URINE CULTURE/COLONY COUNT: CPT | Mod: ZL | Performed by: FAMILY MEDICINE

## 2019-08-22 PROCEDURE — G0463 HOSPITAL OUTPT CLINIC VISIT: HCPCS | Mod: 25

## 2019-08-22 PROCEDURE — 99213 OFFICE O/P EST LOW 20 MIN: CPT | Performed by: FAMILY MEDICINE

## 2019-08-22 PROCEDURE — 87186 SC STD MICRODIL/AGAR DIL: CPT | Mod: ZL | Performed by: FAMILY MEDICINE

## 2019-08-22 PROCEDURE — 81001 URINALYSIS AUTO W/SCOPE: CPT | Mod: ZL | Performed by: FAMILY MEDICINE

## 2019-08-22 RX ORDER — PHENAZOPYRIDINE HYDROCHLORIDE 100 MG/1
100-200 TABLET, FILM COATED ORAL 3 TIMES DAILY PRN
Qty: 10 TABLET | Refills: 0 | Status: SHIPPED | OUTPATIENT
Start: 2019-08-22 | End: 2019-08-25

## 2019-08-22 RX ORDER — CIPROFLOXACIN 250 MG/1
250 TABLET, FILM COATED ORAL 2 TIMES DAILY
Qty: 6 TABLET | Refills: 0 | Status: SHIPPED | OUTPATIENT
Start: 2019-08-22 | End: 2019-08-25

## 2019-08-22 ASSESSMENT — PAIN SCALES - GENERAL: PAINLEVEL: NO PAIN (0)

## 2019-08-22 ASSESSMENT — MIFFLIN-ST. JEOR: SCORE: 1418.49

## 2019-08-22 NOTE — NURSING NOTE
"Chief Complaint   Patient presents with     UTI       Initial /80 (BP Location: Right arm, Patient Position: Sitting, Cuff Size: Adult Regular)   Pulse 115   Temp 97  F (36.1  C) (Tympanic)   Ht 1.6 m (5' 3\")   Wt 73.9 kg (163 lb)   LMP 08/09/2019 (Exact Date)   SpO2 99%   BMI 28.87 kg/m   Estimated body mass index is 28.87 kg/m  as calculated from the following:    Height as of this encounter: 1.6 m (5' 3\").    Weight as of this encounter: 73.9 kg (163 lb).  Medication Reconciliation: complete   Kathryn Wadsworth LPN    "

## 2019-08-22 NOTE — PATIENT INSTRUCTIONS
Complete antibiotic course.  Pyridium for pain.  Push fluids.    Follow up with any residual symptoms.

## 2019-08-22 NOTE — PROGRESS NOTES
Subjective     Eugenie Aguero is a 34 year old female who presents to clinic today for the following health issues:    HPI   URINARY TRACT SYMPTOMS      Duration: 2 weeks    Description  frequency, urgency, odor, back pain and vaginal discharge, blood in urine - 2 days ago and since    Intensity:  mild    Accompanying signs and symptoms:  Fever/chills: no   Flank pain NO  Nausea and vomiting: no   Vaginal symptoms: discharge and odor  Abdominal/Pelvic Pain: no     History  History of frequent UTI's: no   History of kidney stones: no   Sexually Active: YES  Possibility of pregnancy: Yes    Precipitating or alleviating factors: None    Therapies tried and outcome: cranberry juice      Took home test     Outcome: helped but didn't go away    E Coli UTI 4/2019 - ER visit - treated with Macrobid; however, patient didn't realize she had a UTI - never picked up the script        Current Outpatient Medications   Medication     ciprofloxacin (CIPRO) 250 MG tablet     Escitalopram Oxalate (LEXAPRO PO)     IBUPROFEN PO     phenazopyridine (PYRIDIUM) 100 MG tablet     PROPRANOLOL HCL PO     No current facility-administered medications for this visit.        Patient Active Problem List   Diagnosis     Anxiety     Major depressive disorder, recurrent episode, moderate (H)     Bilateral carpal tunnel syndrome     Past Surgical History:   Procedure Laterality Date     RELEASE CARPAL TUNNEL Right 11/4/2015    Procedure: RELEASE CARPAL TUNNEL;  Surgeon: Keith Dove MD;  Location: [facility]       Social History     Tobacco Use     Smoking status: Current Every Day Smoker     Packs/day: 0.50     Years: 10.00     Pack years: 5.00   Substance Use Topics     Alcohol use: No     No family history on file.        Reviewed and updated as needed this visit by Provider  Allergies  Meds         Review of Systems   ROS COMP: Constitutional, HEENT, cardiovascular, pulmonary, gi and gu systems are negative, except as otherwise noted.     "  Objective    /80 (BP Location: Right arm, Patient Position: Sitting, Cuff Size: Adult Regular)   Pulse 115   Temp 97  F (36.1  C) (Tympanic)   Ht 1.6 m (5' 3\")   Wt 73.9 kg (163 lb)   LMP 08/09/2019 (Exact Date)   SpO2 99%   BMI 28.87 kg/m    Body mass index is 28.87 kg/m .  Physical Exam   GENERAL: alert, no distress and over weight  NECK: no adenopathy, no asymmetry, masses, or scars and thyroid normal to palpation  RESP: lungs clear to auscultation - no rales, rhonchi or wheezes  CV: regular rate and rhythm, normal S1 S2, no S3 or S4, no murmur, click or rub, no peripheral edema and peripheral pulses strong  ABDOMEN: soft, nontender, no hepatosplenomegaly, no masses and bowel sounds normal  MS: no gross musculoskeletal defects noted, no edema  PSYCH: mentation appears normal, affect normal/bright    Diagnostic Test Results:  Results for orders placed or performed in visit on 08/22/19 (from the past 24 hour(s))   UA reflex to Microscopic and Culture   Result Value Ref Range    Color Urine Yellow     Appearance Urine Cloudy     Glucose Urine Negative NEG^Negative mg/dL    Bilirubin Urine Negative NEG^Negative    Ketones Urine Negative NEG^Negative mg/dL    Specific Gravity Urine 1.026 1.003 - 1.035    Blood Urine Moderate (A) NEG^Negative    pH Urine 6.0 4.7 - 8.0 pH    Protein Albumin Urine 100 (A) NEG^Negative mg/dL    Urobilinogen mg/dL Normal 0.0 - 2.0 mg/dL    Nitrite Urine Positive (A) NEG^Negative    Leukocyte Esterase Urine Large (A) NEG^Negative    Source Midstream Urine     RBC Urine 34 (H) 0 - 2 /HPF    WBC Urine >182 (H) 0 - 5 /HPF    WBC Clumps Present (A) NEG^Negative /HPF    Bacteria Urine Moderate (A) NEG^Negative /HPF    Squamous Epithelial /HPF Urine 10 (H) 0 - 1 /HPF    Transitional Epi 3 (H) 0 - 1 /HPF    Mucous Urine Present (A) NEG^Negative /LPF    Hyaline Casts 8 (A) OTO2^O - 2 /LPF           Assessment & Plan       ICD-10-CM    1. Lower urinary tract symptoms (LUTS) R39.9 UA " "reflex to Microscopic and Culture     Urine Culture Aerobic Bacterial   2. Acute cystitis with hematuria N30.01 ciprofloxacin (CIPRO) 250 MG tablet     phenazopyridine (PYRIDIUM) 100 MG tablet        Tobacco Cessation:   reports that she has been smoking.  She has a 5.00 pack-year smoking history. She does not have any smokeless tobacco history on file.  Tobacco Cessation Action Plan: Information offered: Patient not interested at this time      BMI:   Estimated body mass index is 28.87 kg/m  as calculated from the following:    Height as of this encounter: 1.6 m (5' 3\").    Weight as of this encounter: 73.9 kg (163 lb).           Patient Instructions   Complete antibiotic course.  Pyridium for pain.  Push fluids.    Follow up with any residual symptoms.          No follow-ups on file.    Samaria Terry MD  [facility] [place]    "

## 2019-08-24 LAB
BACTERIA SPEC CULT: ABNORMAL
SPECIMEN SOURCE: ABNORMAL

## 2020-11-10 NOTE — RESULT ENCOUNTER NOTE
Message left for patient to return call. If you are a smoker, it is important for your health to stop smoking. Please be aware that second hand smoke is also harmful.

## 2021-09-12 PROBLEM — Z72.0 TOBACCO ABUSE: Status: ACTIVE | Noted: 2021-09-12

## 2021-10-04 ENCOUNTER — NURSE TRIAGE (OUTPATIENT)
Dept: FAMILY MEDICINE | Facility: OTHER | Age: 36
End: 2021-10-04

## 2021-10-04 ENCOUNTER — OFFICE VISIT (OUTPATIENT)
Dept: FAMILY MEDICINE | Facility: OTHER | Age: 36
End: 2021-10-04
Attending: NURSE PRACTITIONER
Payer: COMMERCIAL

## 2021-10-04 VITALS
RESPIRATION RATE: 20 BRPM | TEMPERATURE: 97.4 F | BODY MASS INDEX: 28.34 KG/M2 | OXYGEN SATURATION: 99 % | WEIGHT: 160 LBS

## 2021-10-04 DIAGNOSIS — J06.9 ACUTE URI: Primary | ICD-10-CM

## 2021-10-04 DIAGNOSIS — Z72.0 TOBACCO ABUSE: ICD-10-CM

## 2021-10-04 DIAGNOSIS — R03.0 ELEVATED BP WITHOUT DIAGNOSIS OF HYPERTENSION: ICD-10-CM

## 2021-10-04 DIAGNOSIS — H65.92 OME (OTITIS MEDIA WITH EFFUSION), LEFT: ICD-10-CM

## 2021-10-04 LAB — GROUP A STREP BY PCR: NOT DETECTED

## 2021-10-04 PROCEDURE — G0463 HOSPITAL OUTPT CLINIC VISIT: HCPCS

## 2021-10-04 PROCEDURE — 87651 STREP A DNA AMP PROBE: CPT | Mod: ZL | Performed by: NURSE PRACTITIONER

## 2021-10-04 PROCEDURE — U0003 INFECTIOUS AGENT DETECTION BY NUCLEIC ACID (DNA OR RNA); SEVERE ACUTE RESPIRATORY SYNDROME CORONAVIRUS 2 (SARS-COV-2) (CORONAVIRUS DISEASE [COVID-19]), AMPLIFIED PROBE TECHNIQUE, MAKING USE OF HIGH THROUGHPUT TECHNOLOGIES AS DESCRIBED BY CMS-2020-01-R: HCPCS | Mod: ZL | Performed by: NURSE PRACTITIONER

## 2021-10-04 PROCEDURE — 99214 OFFICE O/P EST MOD 30 MIN: CPT | Performed by: NURSE PRACTITIONER

## 2021-10-04 ASSESSMENT — PAIN SCALES - GENERAL: PAINLEVEL: MODERATE PAIN (5)

## 2021-10-04 NOTE — TELEPHONE ENCOUNTER
Pt scheduled per  PCP.    Next 5 appointments (look out 90 days)    Oct 04, 2021  2:30 PM  (Arrive by 2:15 PM)  SHORT with Tiarra Rao NP  [facility] [place] ([facility] [place] ) [address]  [place] [address]  [phone]

## 2021-10-04 NOTE — PROGRESS NOTES
Assessment & Plan     Acute URI  Tobacco abuse  OME (otitis media with effusion), left  Patient with URI symptoms times 7 days and was exposed to strep. Will therefore test for Covid and strep. Also has a left OM with sinus pressure. Will therefore treat with Augmentin times 10 days. Symptomatic cares also encouraged. The patient will follow up with new or worsening symptoms. Smoking cessation encouraged. Will quarantine until tests are back.     -- Symptomatic treatments recommended.    - Ensure you are staying hydrated by drinking plenty of fluids or eating foods such as popsicles, jello, pudding.  - Honey can be soothing for sore throat  - Warm salt water gurgles can help soothe sore throat  - Rest  - Humidifier can help with congestion and help keep mucus membranes such as throat and nose from drying out.  - Sleeping slightly propped up can help with congestion and postnasal drainage that can worsen cough at bedtime.  - As long as you have never been told to take Tylenol and/or Ibuprofen you can use them to manage fever and body aches per package instructions  Make sure you eat when you take ibuprofen to avoid stomach upset.  - OTC cough medications per package instructions to help with cough. Check to see if the cough/cold medication already has acetaminophen (Tylenol) in it. If it does avoid taking additional Tylenol.  - If sudden onset of new fever, worsening symptoms return for further evaluation.  - OTC nasal steroid such as Flonase can help decrease sinus inflammation to help with congestion.  - Education provided on symptoms of post-viral bacterial infections including ear infection and pneumonia. This would require re-evaluation for treatment.        - amoxicillin-clavulanate (AUGMENTIN) 875-125 MG tablet; Take 1 tablet by mouth 2 times daily for 10 days    Elevated BP without diagnosis of hypertension  BP high, but she does not feel well. Will f/up when feeling better to address her BP.         No  follow-ups on file.    Tiarra Rao NP  [facility] - SELENA Traore is a 36 year old who presents for the following health issue    HPI     Acute Illness  Acute illness concerns: sinus pressure, bilateral ear pain, no fevers  Onset/Duration: 1 week ago  Symptoms:  Fever: no  Chills/Sweats: YES  Headache (location?): YES- forehead  Sinus Pressure: YES  Conjunctivitis:  YES  Ear Pain: YES: bilateral  Rhinorrhea: YES  Congestion: YES  Sore Throat: no  Cough: YES-productive of yellow sputum  Wheeze: no, no shortness of breath  Decreased Appetite: YES  Nausea: no  Vomiting: no  Diarrhea: no  Dysuria/Freq.: no  Dysuria or Hematuria: no  Fatigue/Achiness: YES, tired with body aches  Sick/Strep Exposure: yes, strep  Therapies tried and outcome: mucinex D and ibuprofen.   -She does smoke.   -No known asthma.     Review of Systems   As noted in the HPI.       Objective    Temp 97.4  F (36.3  C) (Tympanic)   Resp 20   Wt 72.6 kg (160 lb)   SpO2 99%   BMI 28.34 kg/m    Body mass index is 28.34 kg/m .  Physical Exam   GENERAL: healthy, alert and no distress; does appear fatigued   EYES: Eyes grossly normal to inspection, PERRL and conjunctivae and sclerae normal  HENT: normal cephalic/atraumatic, right ear canal and TM normal, Left with erythematous effusion, canal normal, nose and mouth without ulcers or lesions, oropharynx clear and oral mucous membranes moist  NECK: no adenopathy,   RESP: lungs clear to auscultation - no rales, rhonchi or wheezes  CV: regular rate and rhythm, no murmur, click or rub, no peripheral edema  ABDOMEN: soft, nontender, no masses and bowel sounds normal  NEURO: Normal strength and tone, mentation intact and speech normal  PSYCH: mentation appears normal, affect normal/bright

## 2021-10-04 NOTE — NURSING NOTE
"Chief Complaint   Patient presents with     URI       Initial Temp 97.4  F (36.3  C) (Tympanic)   Resp 20   Wt 72.6 kg (160 lb)   SpO2 99%   BMI 28.34 kg/m   Estimated body mass index is 28.34 kg/m  as calculated from the following:    Height as of 8/22/19: 1.6 m (5' 3\").    Weight as of this encounter: 72.6 kg (160 lb).  Medication Reconciliation: complete  Terri Martinez LPN    "

## 2021-10-04 NOTE — PATIENT INSTRUCTIONS
Rest.   Push fluids.   Ok to alternate between ibuprofen and Tylenol.   Take the Augmentin as prescribed.   Return with new or worsening symptoms.

## 2021-10-04 NOTE — TELEPHONE ENCOUNTER
"Pt called, reports sinus congestion, cough, earache for the past week. No fever. Requesting appt today. Please advise. Thank you!      Additional Information    Negative: Sounds like a life-threatening emergency to the triager    Negative: Difficulty breathing, and not from stuffy nose (e.g., not relieved by cleaning out the nose)    Negative: SEVERE headache and has fever    Negative: Patient sounds very sick or weak to the triager    Negative: SEVERE sinus pain    Negative: Severe headache    Negative: Redness or swelling on the cheek, forehead, or around the eye    Negative: Fever > 103 F (39.4 C)    Negative: Fever > 101 F (38.3 C) and over 60 years of age    Negative: Fever > 100.0 F (37.8 C) and has diabetes mellitus or a weak immune system (e.g., HIV positive, cancer chemotherapy, organ transplant, splenectomy, chronic steroids)    Negative: Fever > 100.0 F (37.8 C) and bedridden (e.g., nursing home patient, stroke, chronic illness, recovering from surgery)    Earache    Answer Assessment - Initial Assessment Questions  1. LOCATION: \"Where does it hurt?\"       No pain  2. ONSET: \"When did the sinus pain start?\"  (e.g., hours, days)       1 week ago  3. SEVERITY: \"How bad is the pain?\"   (Scale 1-10; mild, moderate or severe)    - MILD (1-3): doesn't interfere with normal activities     - MODERATE (4-7): interferes with normal activities (e.g., work or school) or awakens from sleep    - SEVERE (8-10): excruciating pain and patient unable to do any normal activities         No sinus pain  4. RECURRENT SYMPTOM: \"Have you ever had sinus problems before?\" If so, ask: \"When was the last time?\" and \"What happened that time?\"       yes  5. NASAL CONGESTION: \"Is the nose blocked?\" If so, ask, \"Can you open it or must you breathe through the mouth?\"      yes  6. NASAL DISCHARGE: \"Do you have discharge from your nose?\" If so ask, \"What color?\"      yes  7. FEVER: \"Do you have a fever?\" If so, ask: \"What is it, how was " "it measured, and when did it start?\"       no  8. OTHER SYMPTOMS: \"Do you have any other symptoms?\" (e.g., sore throat, cough, earache, difficulty breathing)      Earache, cough  9. PREGNANCY: \"Is there any chance you are pregnant?\" \"When was your last menstrual period?\"      no    Protocols used: SINUS PAIN AND CONGESTION-A-OH      "

## 2021-10-06 ENCOUNTER — TELEPHONE (OUTPATIENT)
Dept: FAMILY MEDICINE | Facility: OTHER | Age: 36
End: 2021-10-06

## 2021-10-06 ENCOUNTER — HOSPITAL ENCOUNTER (EMERGENCY)
Facility: HOSPITAL | Age: 36
Discharge: HOME | End: 2021-10-06
Attending: NURSE PRACTITIONER | Admitting: NURSE PRACTITIONER
Payer: COMMERCIAL

## 2021-10-06 VITALS
TEMPERATURE: 98.5 F | OXYGEN SATURATION: 100 % | RESPIRATION RATE: 16 BRPM | DIASTOLIC BLOOD PRESSURE: 103 MMHG | SYSTOLIC BLOOD PRESSURE: 141 MMHG | HEART RATE: 98 BPM

## 2021-10-06 DIAGNOSIS — H10.32 ACUTE CONJUNCTIVITIS OF LEFT EYE: Primary | ICD-10-CM

## 2021-10-06 DIAGNOSIS — H10.32 ACUTE CONJUNCTIVITIS OF LEFT EYE, UNSPECIFIED ACUTE CONJUNCTIVITIS TYPE: ICD-10-CM

## 2021-10-06 LAB — SARS-COV-2 RNA RESP QL NAA+PROBE: POSITIVE

## 2021-10-06 PROCEDURE — 99213 OFFICE O/P EST LOW 20 MIN: CPT | Performed by: NURSE PRACTITIONER

## 2021-10-06 PROCEDURE — G0463 HOSPITAL OUTPT CLINIC VISIT: HCPCS

## 2021-10-06 PROCEDURE — 250N000009 HC RX 250: Performed by: NURSE PRACTITIONER

## 2021-10-06 RX ORDER — TETRACAINE HYDROCHLORIDE 5 MG/ML
1-2 SOLUTION OPHTHALMIC ONCE
Status: COMPLETED | OUTPATIENT
Start: 2021-10-06 | End: 2021-10-06

## 2021-10-06 RX ORDER — ERYTHROMYCIN 5 MG/G
OINTMENT OPHTHALMIC ONCE
Status: COMPLETED | OUTPATIENT
Start: 2021-10-06 | End: 2021-10-06

## 2021-10-06 RX ORDER — ERYTHROMYCIN 5 MG/G
0.5 OINTMENT OPHTHALMIC 4 TIMES DAILY
Qty: 14 G | Refills: 0 | Status: SHIPPED | OUTPATIENT
Start: 2021-10-06 | End: 2021-10-13

## 2021-10-06 RX ADMIN — TETRACAINE HYDROCHLORIDE 2 DROP: 5 SOLUTION OPHTHALMIC at 20:47

## 2021-10-06 RX ADMIN — ERYTHROMYCIN 1 G: 5 OINTMENT OPHTHALMIC at 21:03

## 2021-10-06 ASSESSMENT — ENCOUNTER SYMPTOMS
NAUSEA: 0
EYE ITCHING: 0
CHILLS: 0
DIARRHEA: 0
SHORTNESS OF BREATH: 0
EYE REDNESS: 1
VOMITING: 0
EYE PAIN: 1
PSYCHIATRIC NEGATIVE: 1
EYE DISCHARGE: 1
PHOTOPHOBIA: 0
FEVER: 0

## 2021-10-06 ASSESSMENT — VISUAL ACUITY
OD: 20/30
OS: 20/30

## 2021-10-06 NOTE — TELEPHONE ENCOUNTER
"-Coronavirus (COVID-19) Notification    Caller Name (Patient, parent, daughter/son, grandparent, etc)  Patient    Reason for call  Notify of Positive Coronavirus (COVID-19) lab results, assess symptoms,  review  [facility] recommendations    Lab Result    Lab test:  2019-nCoV rRt-PCR or SARS-CoV-2 PCR    Oropharyngeal AND/OR nasopharyngeal swabs is POSITIVE for 2019-nCoV RNA/SARS-COV-2 PCR (COVID-19 virus)    RN Recommendations/Instructions per [facility] Coronavirus COVID-19 recommendations    Brief introduction script  Introduce self then review script:  \"I am calling on behalf of [employer].  We were notified that your Coronavirus test (COVID-19) for was POSITIVE for the virus.  I have some information to relay to you but first I wanted to mention that the [place] Dept of Health will be contacting you shortly [it's possible MD already called Patient] to talk to you more about how you are feeling and other people you have had contact with who might now also have the virus.  Also,  [employer] is Partnering with the [facility] for Covid-19 research, you may be contacted directly by research staff.\"    Assessment (Inquire about Patient's current symptoms)   Assessment   Current Symptoms at time of phone call: (if no symptoms, document No symptoms] None   Symptoms onset (if applicable) `1 week ago     If at time of call, Patients symptoms hare worsened, the Patient should contact 911 or have someone drive them to Emergency Dept promptly:      If Patient calling 911, inform 911 personal that you have tested positive for the Coronavirus (COVID-19).  Place mask on and await 911 to arrive.    If Emergency Dept, If possible, please have another adult drive you to the Emergency Dept but you need to wear mask when in contact with other people.      Monoclonal Antibody Administration    You may be eligible to receive a new treatment with a monoclonal antibody for preventing hospitalization in " "patients at high risk for complications from COVID-19.   This medication is still experimental and available on a limited basis; it is given through an IV and must be given at an infusion center. Please note that not all people who are eligible will receive the medication since it is in limited supply.     Are you interested in being considered for this medication?  No.   Does the patient fit the criteria: No    If patient qualifies based on above criteria:  \"You will be contacted if you are selected to receive this treatment in the next 1-2 business days.   This is time sensitive and if you are not selected in the next 1-2 business days, you will not receive the medication.  If you do not receive a call to schedule, you have not been selected.\"      Review information with Patient    Your result was positive. This means you have COVID-19 (coronavirus).  We have sent you a letter that reviews the information that I'll be reviewing with you now.    How can I protect others?    If you have symptoms: stay home and away from others (self-isolate) until:    You've had no fever--and no medicine that reduces fever--for 1 full day (24 hours). And       Your other symptoms have gotten better. For example, your cough or breathing has improved. And     At least 10 days have passed since your symptoms started. (If you've been told by a doctor that you have a weak immune system, wait 20 days.)     If you don't have symptoms: Stay home and away from others (self-isolate) until at least 10 days have passed since your first positive COVID-19 test. (Date test collected)    During this time:    Stay in your own room, including for meals. Use your own bathroom if you can.    Stay away from others in your home. No hugging, kissing or shaking hands. No visitors.     Don't go to work, school or anywhere else.     Clean  high touch  surfaces often (doorknobs, counters, handles, etc.). Use a household cleaning spray or wipes. You'll find a " full list on the EPA website at www.epa.gov/pesticide-registration/list-n-disinfectants-use-against-sars-cov-2.     Cover your mouth and nose with a mask, tissue or other face covering to avoid spreading germs.    Wash your hands and face often with soap and water.    Make a list of people you have been in close contact with recently, even if either of you wore a face covering.   ; Start your list from 2 days before you became ill or had a positive test.  ; Include anyone that was within 6 feet of you for a cumulative total of 15 minutes or more in 24 hours. (Example: if you sat next to Isaac for 5 minutes in the morning and 10 minutes in the afternoon, then you were in close contact for 15 minutes total that day. Isaac would be added to your list.)    A public health worker will call or text you. It is important that you answer. They will ask you questions about possible exposures to COVID-19, such as people you have been in direct contact with and places you have visited.    Tell the people on your list that you have COVID-19; they should stay away from others for 14 days starting from the last time they were in contact with you (unless you are told something different from a public health worker).     Caregivers in these groups are at risk for severe illness due to COVID-19:  o People 65 years and older  o People who live in a nursing home or long-term care facility  o People with chronic disease (lung, heart, cancer, diabetes, kidney, liver, immunologic)  o People who have a weakened immune system, including those who:  - Are in cancer treatment  - Take medicine that weakens the immune system, such as corticosteroids  - Had a bone marrow or organ transplant  - Have an immune deficiency  - Have poorly controlled HIV or AIDS  - Are obese (body mass index of 40 or higher)  - Smoke regularly    Caregivers should wear gloves while washing dishes, handling laundry and cleaning bedrooms and bathrooms.    Wash and dry  laundry with special caution. Don't shake dirty laundry, and use the warmest water setting you can.    If you have a weakened immune system, ask your doctor about other actions you should take.    For more tips, go to www.cdc.gov/coronavirus/2019-ncov/downloads/10Things.pdf.    You should not go back to work until you meet the guidelines above for ending your home isolation. You don't need to be retested for COVID-19 before going back to work--studies show that you won't spread the virus if it's been at least 10 days since your symptoms started (or 20 days, if you have a weak immune system).    Employers: This document serves as formal notice of your employee's medical guidelines for going back to work. They must meet the above guidelines before going back to work in person.    How can I take care of myself?    1. Get lots of rest. Drink extra fluids (unless a doctor has told you not to).    2. Take Tylenol (acetaminophen) for fever or pain. If you have liver or kidney problems, ask your family doctor if it's okay to take Tylenol.     Take either:     650 mg (two 325 mg pills) every 4 to 6 hours, or     1,000 mg (two 500 mg pills) every 8 hours as needed.     Note: Don't take more than 3,000 mg in one day. Acetaminophen is found in many medicines (both prescribed and over-the-counter medicines). Read all labels to be sure you don't take too much.    For children, check the Tylenol bottle for the right dose (based on their age or weight).    3. If you have other health problems (like cancer, heart failure, an organ transplant or severe kidney disease): Call your specialty clinic if you don't feel better in the next 2 days.    4. Know when to call 911: Emergency warning signs include:    Trouble breathing or shortness of breath    Pain or pressure in the chest that doesn't go away    Feeling confused like you haven't felt before, or not being able to wake up    Bluish-colored lips or face    5. Sign up for [facility]  [place]. We know it's scary to hear that you have COVID-19. We want to track your symptoms to make sure you're okay over the next 2 weeks. Please look for an email from Aniya Britton--this is a free, online program that we'll use to keep in touch. To sign up, follow the link in the email. Learn more at [email or web address].    Where can I get more information?    [facility]: [email or web address]    Coronavirus Basics: www.health.[email or web address].[place]./diseases/coronavirus/basics.html    What to Do If You're Sick: www.cdc.gov/coronavirus/2019-ncov/about/steps-when-sick.html    Ending Home Isolation: www.cdc.gov/coronavirus/2019-ncov/hcp/disposition-in-home-patients.html     Caring for Someone with COVID-19: www.cdc.gov/coronavirus/2019-ncov/if-you-are-sick/care-for-someone.html     [facility] clinical trials (COVID-19 research studies): clinicalaffairs.[facility]-clinical-trials     A Positive COVID-19 letter will be sent via [employer] or the mail. (Exception, no letters sent to Presurgerical/Preprocedure Patients)    Heidy Dominguez LPN

## 2021-10-07 ASSESSMENT — ENCOUNTER SYMPTOMS
HEADACHES: 0
WEAKNESS: 0
DIZZINESS: 0

## 2021-10-07 NOTE — ED PROVIDER NOTES
History     Chief Complaint   Patient presents with     Eye Pain     HPI  Eugenie Aguero is a 36 year old female who presents to urgent care today (ambulatory) with complaints of getting a hair in her left eye yesterday which resulted in eye redness and pain.  Eye pain currently 3/10, no medication or treatment attempted.  Patient wears contacts but has not wore them this week.  Does not currently have an eye doctor.  Patient currently COVID positive, does not have any current symptoms.  No other concerns.       Allergies:  Allergies   Allergen Reactions     Seasonal Allergies      Seasonal allergies     Sulfa Drugs        Problem List:    Patient Active Problem List    Diagnosis Date Noted     Tobacco abuse 09/12/2021     Priority: Medium     Anxiety 11/03/2015     Priority: Medium     Major depressive disorder, recurrent episode, moderate (H) 11/03/2015     Priority: Medium     Bilateral carpal tunnel syndrome 11/03/2015     Priority: Medium        Past Medical History:    Past Medical History:   Diagnosis Date     Anxiety      Bilateral carpal tunnel syndrome 11/3/2015     Depressive disorder        Past Surgical History:    Past Surgical History:   Procedure Laterality Date     RELEASE CARPAL TUNNEL Right 11/4/2015    Procedure: RELEASE CARPAL TUNNEL;  Surgeon: Keith Dove MD;  Location: [facility]       Family History:    No family history on file.    Social History:  Marital Status:  Single [1]  Social History     Tobacco Use     Smoking status: Current Every Day Smoker     Packs/day: 0.50     Years: 10.00     Pack years: 5.00     Types: Cigarettes     Smokeless tobacco: Never Used   Substance Use Topics     Alcohol use: No     Drug use: No        Medications:    amoxicillin-clavulanate (AUGMENTIN) 875-125 MG tablet  erythromycin (ROMYCIN) 5 MG/GM ophthalmic ointment  Escitalopram Oxalate (LEXAPRO PO)  IBUPROFEN PO  PROPRANOLOL HCL PO      Review of Systems   Constitutional: Negative for chills and fever.    Eyes: Positive for pain, discharge (watery) and redness. Negative for photophobia, itching and visual disturbance.   Respiratory: Negative for shortness of breath.    Cardiovascular: Negative for chest pain.   Gastrointestinal: Negative for diarrhea, nausea and vomiting.   Neurological: Negative for dizziness, weakness and headaches.   Psychiatric/Behavioral: Negative.      Physical Exam   BP: (!) 156/112  Pulse: 100  Temp: 98.5  F (36.9  C)  Resp: 16  SpO2: 100 %  BP Recheck: 141/103    Physical Exam  Vitals and nursing note reviewed.   Constitutional:       General: She is not in acute distress.     Appearance: Normal appearance. She is not ill-appearing or toxic-appearing.   HENT:      Head: Normocephalic.      Right Ear: Tympanic membrane, ear canal and external ear normal.      Left Ear: Tympanic membrane, ear canal and external ear normal.      Nose: Nose normal.      Mouth/Throat:      Mouth: Mucous membranes are moist.      Pharynx: Oropharynx is clear.   Eyes:      General: Lids are normal. Lids are everted, no foreign bodies appreciated. Gaze aligned appropriately. No allergic shiner, visual field deficit or scleral icterus.        Left eye: No foreign body or hordeolum.      Extraocular Movements: Extraocular movements intact.      Conjunctiva/sclera:      Left eye: Left conjunctiva is injected. Exudate present. No chemosis or hemorrhage.     Pupils: Pupils are equal, round, and reactive to light.      Left eye: No corneal abrasion.   Cardiovascular:      Rate and Rhythm: Normal rate and regular rhythm.      Pulses: Normal pulses.      Heart sounds: Normal heart sounds.   Pulmonary:      Effort: Pulmonary effort is normal.      Breath sounds: Normal breath sounds.   Musculoskeletal:      Cervical back: Normal range of motion and neck supple. No rigidity or tenderness.   Neurological:      Mental Status: She is alert.   Psychiatric:         Mood and Affect: Mood normal.       ED Course     No results  found for this or any previous visit (from the past 24 hour(s)).    Medications   tetracaine (PONTOCAINE) 0.5 % ophthalmic solution 1-2 drop (2 drops Left Eye Given 10/6/21 2047)   erythromycin (ROMYCIN) ophthalmic ointment (1 g Left Eye Given 10/6/21 2103)       Assessments & Plan (with Medical Decision Making)     I have reviewed the nursing notes.    I have reviewed the findings, diagnosis, plan and need for follow up with the patient.  (H10.32) Acute conjunctivitis of left eye  (primary encounter diagnosis)  Plan:   Tetracaine eyedrops placed in left eye followed by fluorescein stain and blue light exam.  No corneal abrasion noted.  Denies photophobia or visual disturbances.  Erythromycin eye ointment ordered and sent to pharmacy.  Patient can call around and follow-up with local eye doctors if symptoms do not improve or patient can follow-up with primary care provider or return to urgent care or ED with any worsening in condition or additional concerns.  Patient in agreement with treatment plan.    Discharge Medication List as of 10/6/2021  8:57 PM      START taking these medications    Details   erythromycin (ROMYCIN) 5 MG/GM ophthalmic ointment Place 0.5 inches Into the left eye 4 times daily for 7 daysDisp-14 g, [id number]           Final diagnoses:   Acute conjunctivitis of left eye     10/6/2021   [facility]     Laquita Posada, [place]  10/07/21 4770

## 2021-10-07 NOTE — DISCHARGE INSTRUCTIONS
Erythromycin eye ointment to left eye    Follow-up with local eye doctor if symptoms do not improve or worsen.    Return to urgent care-ED with any worsening in condition or additional concerns.

## 2021-10-07 NOTE — ED TRIAGE NOTES
Patient presents to  for hair in left eye yesterday and patient woke up this morning with lt eye swelling and pain. Patient states the pain is 4/10.   Patient tested COVID positive on 10/4/21.

## 2021-10-25 ENCOUNTER — TELEPHONE (OUTPATIENT)
Dept: FAMILY MEDICINE | Facility: OTHER | Age: 36
End: 2021-10-25
Payer: COMMERCIAL

## 2021-10-25 NOTE — TELEPHONE ENCOUNTER
11:51 AM    Reason for Call: Phone Call    Description: Needs a note or letter from Tiarra Rao so she can have a pet in her apartment for .     Was an appointment offered for this call? No  If yes : Appointment type              Date    Preferred method for responding to this message: Telephone Call  What is your phone number ? [phone]    If we cannot reach you directly, may we leave a detailed response at the number you provided? Yes    Can this message wait until your PCP/provider returns, if available today? YES, No    Taryn Warner

## 2021-10-26 NOTE — PROGRESS NOTES
Eugenie is a 36 year old who is being evaluated via a billable telephone visit.      What phone number would you like to be contacted at? [phone]   How would you like to obtain your AVS? Mail a copy    Assessment & Plan     Anxiety  Major depressive disorder, recurrent episode, moderate (H)  Slightly worse since she stopped her Lexapro. Will reorder as she has done well in the past taking this. Encouraged discussion with trusted relative or friend to monitor for negative mood changes and change in behaviour during medication initiation and titration. Recommended immediate help if increased thoughts of suicide and call if significant side effects occur. Encouraged patient that this type of medication is not effective immediately, and to be consistant with taking the medication.    I also wrote a letter noting that her cat and dog help with her anxiety.     Will return with new or worsening symptoms.       No follow-ups on file.    Tiarra Rao NP  [facility] - [place]    Subjective   Eugenie is a 36 year old who presents for the following health issues    HPI     Depression and Anxiety Follow-Up    How are you doing with your depression since your last visit? Worsened slightly     How are you doing with your anxiety since your last visit?  Worsened slightly     Are you having other symptoms that might be associated with depression or anxiety? Yes:  panic attacks , irritability , feeling emotional     Have you had a significant life event? OTHER: has stressors , court case withh domestic , has trial coming up     Do you have any concerns with your use of alcohol or other drugs? No     Was taking Lexapro 10 mg, but quit several months ago. Unsure why she stopped taking these. Thinks she just ran out. Did feel it was helping.     No thoughts of suicide.     Requesting a letter to have a pet. She has 1 cat and 1 dog.  She feels her dog protects her from her ex-boyfriend and her cat helps with  her anxiety.     No chest pain or shortness of breath. No abdominal pain. No nausea or vomiting.     Social History     Tobacco Use     Smoking status: Current Every Day Smoker     Packs/day: 0.50     Years: 10.00     Pack years: 5.00     Types: Cigarettes     Smokeless tobacco: Never Used   Substance Use Topics     Alcohol use: No     Drug use: No     PHQ 11/3/2015   PHQ-9 Total Score 0   Q9: Thoughts of better off dead/self-harm past 2 weeks Not at all     No flowsheet data found.  Last PHQ-9 10/27/2021   1.  Little interest or pleasure in doing things 0   2.  Feeling down, depressed, or hopeless 1   3.  Trouble falling or staying asleep, or sleeping too much 1   4.  Feeling tired or having little energy 1   5.  Poor appetite or overeating 0   6.  Feeling bad about yourself 1   7.  Trouble concentrating 1   8.  Moving slowly or restless 1   Q9: Thoughts of better off dead/self-harm past 2 weeks 0   PHQ-9 Total Score 6   Difficulty at work, home, or with people Somewhat difficult     TOSHA-7  10/27/2021   1. Feeling nervous, anxious, or on edge 1   2. Not being able to stop or control worrying 2   3. Worrying too much about different things 2   4. Trouble relaxing 1   5. Being so restless that it is hard to sit still 0   6. Becoming easily annoyed or irritable 1   7. Feeling afraid, as if something awful might happen 1   TOSHA-7 Total Score 8   If you checked any problems, how difficult have they made it for you to do your work, take care of things at home, or get along with other people? Somewhat difficult     Review of Systems   As noted in the HPI.       Objective           Vitals:  No vitals were obtained today due to virtual visit.    Physical Exam   healthy, alert and no distress  PSYCH: Alert and oriented times 3; coherent speech, normal   rate and volume, able to articulate logical thoughts, able   to abstract reason, no tangential thoughts, no hallucinations   or delusions  Her affect is normal  RESP: No  cough, no audible wheezing, able to talk in full sentences  Remainder of exam unable to be completed due to telephone visits            Phone call duration: 5 minutes

## 2021-10-27 ENCOUNTER — VIRTUAL VISIT (OUTPATIENT)
Dept: FAMILY MEDICINE | Facility: OTHER | Age: 36
End: 2021-10-27
Attending: NURSE PRACTITIONER
Payer: COMMERCIAL

## 2021-10-27 DIAGNOSIS — F41.9 ANXIETY: Primary | ICD-10-CM

## 2021-10-27 DIAGNOSIS — F33.1 MAJOR DEPRESSIVE DISORDER, RECURRENT EPISODE, MODERATE (H): ICD-10-CM

## 2021-10-27 PROCEDURE — 99213 OFFICE O/P EST LOW 20 MIN: CPT | Mod: 95 | Performed by: NURSE PRACTITIONER

## 2021-10-27 RX ORDER — ESCITALOPRAM OXALATE 10 MG/1
10 TABLET ORAL DAILY
Qty: 90 TABLET | Refills: 0 | Status: SHIPPED | OUTPATIENT
Start: 2021-10-27 | End: 2022-05-17

## 2021-10-27 ASSESSMENT — ANXIETY QUESTIONNAIRES
5. BEING SO RESTLESS THAT IT IS HARD TO SIT STILL: NOT AT ALL
3. WORRYING TOO MUCH ABOUT DIFFERENT THINGS: MORE THAN HALF THE DAYS
4. TROUBLE RELAXING: SEVERAL DAYS
GAD7 TOTAL SCORE: 8
2. NOT BEING ABLE TO STOP OR CONTROL WORRYING: MORE THAN HALF THE DAYS
7. FEELING AFRAID AS IF SOMETHING AWFUL MIGHT HAPPEN: SEVERAL DAYS
1. FEELING NERVOUS, ANXIOUS, OR ON EDGE: SEVERAL DAYS
IF YOU CHECKED OFF ANY PROBLEMS ON THIS QUESTIONNAIRE, HOW DIFFICULT HAVE THESE PROBLEMS MADE IT FOR YOU TO DO YOUR WORK, TAKE CARE OF THINGS AT HOME, OR GET ALONG WITH OTHER PEOPLE: SOMEWHAT DIFFICULT
6. BECOMING EASILY ANNOYED OR IRRITABLE: SEVERAL DAYS

## 2021-10-27 ASSESSMENT — PATIENT HEALTH QUESTIONNAIRE - PHQ9: SUM OF ALL RESPONSES TO PHQ QUESTIONS 1-9: 6

## 2021-10-27 NOTE — LETTER
My Depression Action Plan  Name: Eugenie Aguero   Date of Birth 1985  Date: 10/27/2021    My doctor: Tiarra Rao   My clinic: [facility] - [place]  [address]  [place] [address]  [phone]          GREEN    ZONE   Good Control    What it looks like:     Things are going generally well. You have normal ups and downs. You may even feel depressed from time to time, but bad moods usually last less than a day.   What you need to do:  1. Continue to care for yourself (see self care plan)  2. Check your depression survival kit and update it as needed  3. Follow your physician s recommendations including any medication.  4. Do not stop taking medication unless you consult with your physician first.           YELLOW         ZONE Getting Worse    What it looks like:     Depression is starting to interfere with your life.     It may be hard to get out of bed; you may be starting to isolate yourself from others.    Symptoms of depression are starting to last most all day and this has happened for several days.     You may have suicidal thoughts but they are not constant.   What you need to do:     1. Call your care team. Your response to treatment will improve if you keep your care team informed of your progress. Yellow periods are signs an adjustment may need to be made.     2. Continue your self-care.  Just get dressed and ready for the day.  Don't give yourself time to talk yourself out of it.    3. Talk to someone in your support network.    4. Open up your Depression Self-Care Plan/Wellness Kit.           RED    ZONE Medical Alert - Get Help    What it looks like:     Depression is seriously interfering with your life.     You may experience these or other symptoms: You can t get out of bed most days, can t work or engage in other necessary activities, you have trouble taking care of basic hygiene, or basic responsibilities, thoughts of suicide or death that will not go away,  self-injurious behavior.     What you need to do:  1. Call your care team and request a same-day appointment. If they are not available (weekends or after hours) call your local crisis line, emergency room or 911.          Depression Self-Care Plan / Wellness Kit    Many people find that medication and therapy are helpful treatments for managing depression. In addition, making small changes to your everyday life can help to boost your mood and improve your wellbeing. Below are some tips for you to consider. Be sure to talk with your medical provider and/or behavioral health consultant if your symptoms are worsening or not improving.     Sleep   Sleep hygiene  means all of the habits that support good, restful sleep. It includes maintaining a consistent bedtime and wake time, using your bedroom only for sleeping or sex, and keeping the bedroom dark and free of distractions like a computer, smartphone, or television.     Develop a Healthy Routine  Maintain good hygiene. Get out of bed in the morning, make your bed, brush your teeth, take a shower, and get dressed. Don t spend too much time viewing media that makes you feel stressed. Find time to relax each day.    Exercise  Get some form of exercise every day. This will help reduce pain and release endorphins, the  feel good  chemicals in your brain. It can be as simple as just going for a walk or doing some gardening, anything that will get you moving.      Diet  Strive to eat healthy foods, including fruits and vegetables. Drink plenty of water. Avoid excessive sugar, caffeine, alcohol, and other mood-altering substances.     Stay Connected with Others  Stay in touch with friends and family members.    Manage Your Mood  Try deep breathing, massage therapy, biofeedback, or meditation. Take part in fun activities when you can. Try to find something to smile about each day.     Psychotherapy  Be open to working with a therapist if your provider recommends it.      Medication  Be sure to take your medication as prescribed. Most anti-depressants need to be taken every day. It usually takes several weeks for medications to work. Not all medicines work for all people. It is important to follow-up with your provider to make sure you have a treatment plan that is working for you. Do not stop your medication abruptly without first discussing it with your provider.    Crisis Resources   These hotlines are for both adults and children. They and are open 24 hours a day, 7 days a week unless noted otherwise.      National Suicide Prevention Lifeline   3-302-743-TALK (0095)      Crisis Text Line    www.crisistextline.org  Text HOME to 148668 from anywhere in the [place], anytime, about any type of crisis. A live, trained crisis counselor will receive the text and respond quickly.      [facility] Lifeline for LGBTQ Youth  A national crisis intervention and suicide lifeline for LGBTQ youth under 25. Provides a safe place to talk without judgement. Call 1-132.522.8518; text START to 670567 or visit www.thetrevorproject.org to talk to a trained counselor.      For [facility] crisis numbers, visit the [facility] website at:  https://[place].gov/dhs/people-we-serve/adults/health-care/mental-health/resources/crisis-contacts.jsp

## 2021-10-27 NOTE — NURSING NOTE
"Chief Complaint   Patient presents with     Letter Request     Therapy pet - Has a dog and cat        Initial There were no vitals taken for this visit. Estimated body mass index is 28.34 kg/m  as calculated from the following:    Height as of 8/22/19: 1.6 m (5' 3\").    Weight as of 10/4/21: 72.6 kg (160 lb).  Medication Reconciliation: complete  Sol Villarreal LPN  "

## 2021-10-27 NOTE — LETTER
October 27, 2021      Eugenie GELY Laci  [address]    [place] [address]        To Whom It May Concern,     Please allow Eugenie Aguero to have a cat and dog. She notes that they both help with her anxiety and depression.       Sincerely,        Tiarra Rao, NP

## 2021-10-28 ASSESSMENT — ANXIETY QUESTIONNAIRES: GAD7 TOTAL SCORE: 8

## 2022-05-11 NOTE — PROGRESS NOTES
"Assessment & Plan     Major depressive disorder, recurrent episode, moderate (H)  Depression with anxiety  Anxiety  Panic attack  Pt notes her depression/anxiety hasn't improved. Was seeing a therapist but she moved and pt \"gave up\" on finding another. Not interested in counseling at this time. Pt denies thoughts of harming herself. Pt was interested in a PRN medication for anxiety attacks, however, didn't like that hydroxyzine could cause sleepiness. Will try lexapro and reassess in 4 weeks. See below. Was also started on Propranolol for HTN and migraines. May also help with anxiety.     --escitalopram (LEXAPRO) 10 MG tablet; Take 1 tablet (10 mg) by mouth daily    Intractable migraine without aura and with status migrainosus  Pt notes her migraines had been occurring every two days. Was on propranolol 80 mg and in previous notes, pt noted she had migraines maybe once a month. Will restart propranolol at 60 mg every day and reassess in 4 weeks. Also discussed how headaches could be related to BP and anxiety.     Essential hypertension  /115 on rooming. Recheck 168/112. Pt also has been having more frequent migraines; possibly related to elevated BP. Discussed concern with elevated BP and pt agreeable to taking propranolol as this should help with BP, anxiety, and migraines. Will f/u 4 weeks for recheck.   - propranolol ER (INDERAL LA) 60 MG 24 hr capsule; Take 1 capsule (60 mg) by mouth daily    Screening for HIV (human immunodeficiency virus)  - HIV Antigen Antibody Combo  -Will notify patient of the results when available and intervene accordingly.     Encounter for HCV screening test for low risk patient  Labs pending. Will notify pt of results and plan accordingly.    - Hepatitis C Screen Reflex to HCV RNA Quant and Genotype; Future  - Hepatitis C Screen Reflex to HCV RNA Quant and Genotype    Lipid screening  Lipids slightly elevated. Trying to conceive. Will avoid statin, but she will work on diet and " "exercise.     Screening for diabetes mellitus  Glucose normal.     Screening, anemia, deficiency, iron  Hgb normal.     Dysmenorrhea  Menorrhagia with regular cycle  Pt notes she has been having very painful periods/pelvic pain with menses as well as passing frequent blood clots during menses. Pt notes she was on OCPs in the past but they didn't seem to help with her menorrhagia or dysmenorrhea. Pt also wishing to become pregnant. Pt notes menses are regular but last 3-7 days. Pt inquiring about endometriosis testing.  HGB normal today. TSH normal. Declined pregnancy test as she recently had her menses. Will order a pelvic US. And possible referral to OB.   --Will f/u 4 weeks for physical to update pap and further address.  - TSH with free T4 reflex; Future  - US Pelvic Complete with Transvaginal; Future  - TSH with free T4 reflex    Screening for gonorrhea  - GC/Chlamydia by PCR - [place],; Future  - Will notify patient of the results when available and intervene accordingly.        BMI:   Estimated body mass index is 36.31 kg/m  as calculated from the following:    Height as of this encounter: 1.6 m (5' 3\").    Weight as of this encounter: 93 kg (205 lb).   Weight management plan: Discussed healthy diet and exercise guidelines    Follow-up for physical, BP recheck, and mental health in 4 weeks.    Rachael Simons, [employer] student     I was present with the nurse practitioner student who participated in the service and in the documentation of the note. I have verified the history and personally performed the physical exam and medical decision making. I agree with the assessment and plan of care as documented in the note.     Tiarra Rao NP  [facility] [place] Traore is a 36 year old who presents for the following health issues    HPI     Depression and Anxiety Follow-Up     Last seen on 10/27/21. Anxiety and depression had worsened, but she stopped taking her Lexapro. This was " "restarted at 10 mg but she stopped taking it because she ran out of it. Doesn't remember if it helped, but thinks it did.        How are you doing with your depression since your last visit? No change    How are you doing with your anxiety since your last visit?  No change    Are you having other symptoms that might be associated with depression or anxiety? Yes: Sporadic panic attacks , emotional outbursts     Have you had a significant life event? OTHER: life in general           Do you have any concerns with your use of alcohol or other drugs? No     No thoughts of suicide.     No chest pain or shortness of breath. No abdominal pain. No nausea or vomiting.     Pt was seeing a counselor and she moved. Pt reports she hasn't found another one she likes so \"I gave up.\" Not interested in this time.     Reports having a good support system with friends and family. Reports having some sleeping difficulties (either wanting to sleep all the time or difficulty falling asleep).     Dysmenorrhea/Menorrhagia  Pt notes she has been having very painful and heavy periods. States she will have a regular menses, however, she will bleed for 3-7 days but pass several clots (typically smaller than a golfball).   --Pt also notes she and her boyfriend have been trying to conceive for over a year and she has been unable to get pregnant  --LMP was within the last week; pt declines urine HCG today.    Due for several vaccines. Will get at physical    Due for fasting labs. Will obtain today.    Social History     Tobacco Use     Smoking status: Current Every Day Smoker     Packs/day: 0.50     Years: 10.00     Pack years: 5.00     Types: Cigarettes     Smokeless tobacco: Never Used   Substance Use Topics     Alcohol use: No     Drug use: No     PHQ 11/3/2015 10/27/2021   PHQ-9 Total Score 0 6   Q9: Thoughts of better off dead/self-harm past 2 weeks Not at all Not at all     TOSHA-7 SCORE 10/27/2021   Total Score 8     Last PHQ-9 10/27/2021 "   1.  Little interest or pleasure in doing things 0   2.  Feeling down, depressed, or hopeless 1   3.  Trouble falling or staying asleep, or sleeping too much 1   4.  Feeling tired or having little energy 1   5.  Poor appetite or overeating 0   6.  Feeling bad about yourself 1   7.  Trouble concentrating 1   8.  Moving slowly or restless 1   Q9: Thoughts of better off dead/self-harm past 2 weeks 0   PHQ-9 Total Score 6   Difficulty at work, home, or with people Somewhat difficult     TOSHA-7  10/27/2021   1. Feeling nervous, anxious, or on edge 1   2. Not being able to stop or control worrying 2   3. Worrying too much about different things 2   4. Trouble relaxing 1   5. Being so restless that it is hard to sit still 0   6. Becoming easily annoyed or irritable 1   7. Feeling afraid, as if something awful might happen 1   TOSHA-7 Total Score 8   If you checked any problems, how difficult have they made it for you to do your work, take care of things at home, or get along with other people? Somewhat difficult     6}  Migraine     Since your last clinic visit, how have your headaches changed?  Worsened    How often are you getting headaches or migraines? Every other day      Are you able to do normal daily activities when you have a migraine? No    Are you taking rescue/relief medications? (Select all that apply) Other: Excedrin / Ibuprofen     How helpful is your rescue/relief medication?  I get some relief    Are you taking any medications to prevent migraines? (Select all that apply)  No    In the past 4 weeks, how often have you gone to urgent care or the emergency room because of your headaches?  0     Was taking 80 mg propranolol, but stopped 10/27/2021. Doesn't remember it helping much with her migraines.     Still smokes and denies interest in quitting. Smokes 1/2ppd.       Review of Systems   Constitutional, HEENT, cardiovascular, pulmonary, gi and gu systems are negative, except as otherwise noted.      Objective  "   BP (!) 152/115 (BP Location: Left arm, Patient Position: Chair, Cuff Size: Adult Large)   Pulse 115   Temp 98.1  F (36.7  C) (Tympanic)   Ht 1.6 m (5' 3\")   Wt 93 kg (205 lb)   LMP 05/03/2022   SpO2 95%   BMI 36.31 kg/m    Body mass index is 36.31 kg/m .  Physical Exam   GENERAL: healthy, alert and no distress  RESP: lungs clear to auscultation - no rales, rhonchi or wheezes  CV: regular rate and rhythm, normal S1 S2, no S3 or S4, no murmur, click or rub, no peripheral edema and peripheral pulses strong  MS: no gross musculoskeletal defects noted, no edema  PSYCH: mentation appears normal, affect normal/bright    Results for orders placed or performed in visit on 05/17/22   Lipid Profile (Chol, Trig, HDL, LDL calc)     Status: Abnormal   Result Value Ref Range    Cholesterol 208 (H) <200 mg/dL    Triglycerides 130 <150 mg/dL    Direct Measure HDL 56 >=50 mg/dL    LDL Cholesterol Calculated 126 (H) <=100 mg/dL    Non HDL Cholesterol 152 (H) <130 mg/dL    Patient Fasting > 8hrs? Yes     Narrative    Cholesterol  Desirable:  <200 mg/dL    Triglycerides  Normal:  Less than 150 mg/dL  Borderline High:  150-199 mg/dL  High:  200-499 mg/dL  Very High:  Greater than or equal to 500 mg/dL    Direct Measure HDL  Female:  Greater than or equal to 50 mg/dL   Male:  Greater than or equal to 40 mg/dL    LDL Cholesterol  Desirable:  <100mg/dL  Above Desirable:  100-129 mg/dL   Borderline High:  130-159 mg/dL   High:  160-189 mg/dL   Very High:  >= 190 mg/dL    Non HDL Cholesterol  Desirable:  130 mg/dL  Above Desirable:  130-159 mg/dL  Borderline High:  160-189 mg/dL  High:  190-219 mg/dL  Very High:  Greater than or equal to 220 mg/dL   Comprehensive metabolic panel (BMP + Alb, Alk Phos, ALT, AST, Total. Bili, TP)     Status: Abnormal   Result Value Ref Range    Sodium 137 133 - 144 mmol/L    Potassium 3.9 3.4 - 5.3 mmol/L    Chloride 106 94 - 109 mmol/L    Carbon Dioxide (CO2) 26 20 - 32 mmol/L    Anion Gap 5 3 - 14 " mmol/L    Urea Nitrogen 17 7 - 30 mg/dL    Creatinine 0.91 0.52 - 1.04 mg/dL    Calcium 8.4 (L) 8.5 - 10.1 mg/dL    Glucose 92 70 - 99 mg/dL    Alkaline Phosphatase 99 40 - 150 U/L    AST 17 0 - 45 U/L    ALT 31 0 - 50 U/L    Protein Total 8.0 6.8 - 8.8 g/dL    Albumin 3.9 3.4 - 5.0 g/dL    Bilirubin Total 0.3 0.2 - 1.3 mg/dL    GFR Estimate 84 >60 mL/min/1.73m2   TSH with free T4 reflex     Status: Normal   Result Value Ref Range    TSH 2.64 0.40 - 4.00 mU/L   CBC with platelets and differential     Status: Abnormal   Result Value Ref Range    WBC Count 12.3 (H) 4.0 - 11.0 10e3/uL    RBC Count 4.91 3.80 - 5.20 10e6/uL    Hemoglobin 14.6 11.7 - 15.7 g/dL    Hematocrit 43.8 35.0 - 47.0 %    MCV 89 78 - 100 fL    MCH 29.7 26.5 - 33.0 pg    MCHC 33.3 31.5 - 36.5 g/dL    RDW 13.8 10.0 - 15.0 %    Platelet Count 278 150 - 450 10e3/uL    % Neutrophils 66 %    % Lymphocytes 25 %    % Monocytes 5 %    % Eosinophils 3 %    % Basophils 1 %    % Immature Granulocytes 0 %    NRBCs per 100 WBC 0 <1 /100    Absolute Neutrophils 8.2 1.6 - 8.3 10e3/uL    Absolute Lymphocytes 3.0 0.8 - 5.3 10e3/uL    Absolute Monocytes 0.6 0.0 - 1.3 10e3/uL    Absolute Eosinophils 0.3 0.0 - 0.7 10e3/uL    Absolute Basophils 0.1 0.0 - 0.2 10e3/uL    Absolute Immature Granulocytes 0.1 <=0.4 10e3/uL    Absolute NRBCs 0.0 10e3/uL   CBC with platelets and differential     Status: Abnormal    Narrative    The following orders were created for panel order CBC with platelets and differential.  Procedure                               Abnormality         Status                     ---------                               -----------         ------                     CBC with platelets and d...[[id number]]  Abnormal            Final result                 Please view results for these tests on the individual orders.

## 2022-05-17 ENCOUNTER — OFFICE VISIT (OUTPATIENT)
Dept: FAMILY MEDICINE | Facility: OTHER | Age: 37
End: 2022-05-17
Attending: NURSE PRACTITIONER
Payer: COMMERCIAL

## 2022-05-17 VITALS
TEMPERATURE: 98.1 F | DIASTOLIC BLOOD PRESSURE: 115 MMHG | SYSTOLIC BLOOD PRESSURE: 152 MMHG | HEIGHT: 63 IN | OXYGEN SATURATION: 95 % | WEIGHT: 205 LBS | HEART RATE: 115 BPM | BODY MASS INDEX: 36.32 KG/M2

## 2022-05-17 DIAGNOSIS — Z13.0 SCREENING, ANEMIA, DEFICIENCY, IRON: ICD-10-CM

## 2022-05-17 DIAGNOSIS — N92.0 MENORRHAGIA WITH REGULAR CYCLE: ICD-10-CM

## 2022-05-17 DIAGNOSIS — F41.9 ANXIETY: ICD-10-CM

## 2022-05-17 DIAGNOSIS — Z13.1 SCREENING FOR DIABETES MELLITUS: ICD-10-CM

## 2022-05-17 DIAGNOSIS — I10 ESSENTIAL HYPERTENSION: ICD-10-CM

## 2022-05-17 DIAGNOSIS — F33.1 MAJOR DEPRESSIVE DISORDER, RECURRENT EPISODE, MODERATE (H): Primary | ICD-10-CM

## 2022-05-17 DIAGNOSIS — Z13.220 LIPID SCREENING: ICD-10-CM

## 2022-05-17 DIAGNOSIS — F41.8 DEPRESSION WITH ANXIETY: ICD-10-CM

## 2022-05-17 DIAGNOSIS — N94.6 DYSMENORRHEA: ICD-10-CM

## 2022-05-17 DIAGNOSIS — Z11.4 SCREENING FOR HIV (HUMAN IMMUNODEFICIENCY VIRUS): ICD-10-CM

## 2022-05-17 DIAGNOSIS — Z11.3 SCREENING FOR GONORRHEA: ICD-10-CM

## 2022-05-17 DIAGNOSIS — G43.011 INTRACTABLE MIGRAINE WITHOUT AURA AND WITH STATUS MIGRAINOSUS: ICD-10-CM

## 2022-05-17 DIAGNOSIS — F41.0 PANIC ATTACK: ICD-10-CM

## 2022-05-17 DIAGNOSIS — Z11.59 ENCOUNTER FOR HCV SCREENING TEST FOR LOW RISK PATIENT: ICD-10-CM

## 2022-05-17 LAB
ALBUMIN SERPL-MCNC: 3.9 G/DL (ref 3.4–5)
ALP SERPL-CCNC: 99 U/L (ref 40–150)
ALT SERPL W P-5'-P-CCNC: 31 U/L (ref 0–50)
ANION GAP SERPL CALCULATED.3IONS-SCNC: 5 MMOL/L (ref 3–14)
AST SERPL W P-5'-P-CCNC: 17 U/L (ref 0–45)
BASOPHILS # BLD AUTO: 0.1 10E3/UL (ref 0–0.2)
BASOPHILS NFR BLD AUTO: 1 %
BILIRUB SERPL-MCNC: 0.3 MG/DL (ref 0.2–1.3)
BUN SERPL-MCNC: 17 MG/DL (ref 7–30)
CALCIUM SERPL-MCNC: 8.4 MG/DL (ref 8.5–10.1)
CHLORIDE BLD-SCNC: 106 MMOL/L (ref 94–109)
CHOLEST SERPL-MCNC: 208 MG/DL
CO2 SERPL-SCNC: 26 MMOL/L (ref 20–32)
CREAT SERPL-MCNC: 0.91 MG/DL (ref 0.52–1.04)
EOSINOPHIL # BLD AUTO: 0.3 10E3/UL (ref 0–0.7)
EOSINOPHIL NFR BLD AUTO: 3 %
ERYTHROCYTE [DISTWIDTH] IN BLOOD BY AUTOMATED COUNT: 13.8 % (ref 10–15)
FASTING STATUS PATIENT QL REPORTED: YES
GFR SERPL CREATININE-BSD FRML MDRD: 84 ML/MIN/1.73M2
GLUCOSE BLD-MCNC: 92 MG/DL (ref 70–99)
HCT VFR BLD AUTO: 43.8 % (ref 35–47)
HDLC SERPL-MCNC: 56 MG/DL
HGB BLD-MCNC: 14.6 G/DL (ref 11.7–15.7)
IMM GRANULOCYTES # BLD: 0.1 10E3/UL
IMM GRANULOCYTES NFR BLD: 0 %
LDLC SERPL CALC-MCNC: 126 MG/DL
LYMPHOCYTES # BLD AUTO: 3 10E3/UL (ref 0.8–5.3)
LYMPHOCYTES NFR BLD AUTO: 25 %
MCH RBC QN AUTO: 29.7 PG (ref 26.5–33)
MCHC RBC AUTO-ENTMCNC: 33.3 G/DL (ref 31.5–36.5)
MCV RBC AUTO: 89 FL (ref 78–100)
MONOCYTES # BLD AUTO: 0.6 10E3/UL (ref 0–1.3)
MONOCYTES NFR BLD AUTO: 5 %
NEUTROPHILS # BLD AUTO: 8.2 10E3/UL (ref 1.6–8.3)
NEUTROPHILS NFR BLD AUTO: 66 %
NONHDLC SERPL-MCNC: 152 MG/DL
NRBC # BLD AUTO: 0 10E3/UL
NRBC BLD AUTO-RTO: 0 /100
PLATELET # BLD AUTO: 278 10E3/UL (ref 150–450)
POTASSIUM BLD-SCNC: 3.9 MMOL/L (ref 3.4–5.3)
PROT SERPL-MCNC: 8 G/DL (ref 6.8–8.8)
RBC # BLD AUTO: 4.91 10E6/UL (ref 3.8–5.2)
SODIUM SERPL-SCNC: 137 MMOL/L (ref 133–144)
TRIGL SERPL-MCNC: 130 MG/DL
TSH SERPL DL<=0.005 MIU/L-ACNC: 2.64 MU/L (ref 0.4–4)
WBC # BLD AUTO: 12.3 10E3/UL (ref 4–11)

## 2022-05-17 PROCEDURE — 85025 COMPLETE CBC W/AUTO DIFF WBC: CPT | Mod: ZL | Performed by: NURSE PRACTITIONER

## 2022-05-17 PROCEDURE — 84443 ASSAY THYROID STIM HORMONE: CPT | Mod: ZL | Performed by: NURSE PRACTITIONER

## 2022-05-17 PROCEDURE — 87389 HIV-1 AG W/HIV-1&-2 AB AG IA: CPT | Mod: ZL | Performed by: NURSE PRACTITIONER

## 2022-05-17 PROCEDURE — G0463 HOSPITAL OUTPT CLINIC VISIT: HCPCS | Mod: 25

## 2022-05-17 PROCEDURE — 87591 N.GONORRHOEAE DNA AMP PROB: CPT | Mod: ZL | Performed by: NURSE PRACTITIONER

## 2022-05-17 PROCEDURE — 86803 HEPATITIS C AB TEST: CPT | Mod: ZL | Performed by: NURSE PRACTITIONER

## 2022-05-17 PROCEDURE — 80053 COMPREHEN METABOLIC PANEL: CPT | Mod: ZL | Performed by: NURSE PRACTITIONER

## 2022-05-17 PROCEDURE — 80061 LIPID PANEL: CPT | Mod: ZL | Performed by: NURSE PRACTITIONER

## 2022-05-17 PROCEDURE — 99214 OFFICE O/P EST MOD 30 MIN: CPT | Performed by: NURSE PRACTITIONER

## 2022-05-17 PROCEDURE — G0463 HOSPITAL OUTPT CLINIC VISIT: HCPCS

## 2022-05-17 PROCEDURE — 36415 COLL VENOUS BLD VENIPUNCTURE: CPT | Mod: ZL | Performed by: NURSE PRACTITIONER

## 2022-05-17 PROCEDURE — 82040 ASSAY OF SERUM ALBUMIN: CPT | Mod: ZL | Performed by: NURSE PRACTITIONER

## 2022-05-17 RX ORDER — ESCITALOPRAM OXALATE 10 MG/1
10 TABLET ORAL DAILY
Qty: 90 TABLET | Refills: 0 | Status: SHIPPED | OUTPATIENT
Start: 2022-05-17 | End: 2022-07-20

## 2022-05-17 RX ORDER — HYDROXYZINE HYDROCHLORIDE 10 MG/1
10 TABLET, FILM COATED ORAL 3 TIMES DAILY PRN
Qty: 30 TABLET | Refills: 0 | Status: SHIPPED | OUTPATIENT
Start: 2022-05-17 | End: 2022-05-17

## 2022-05-17 RX ORDER — PROPRANOLOL HCL 60 MG
60 CAPSULE, EXTENDED RELEASE 24HR ORAL DAILY
Qty: 90 CAPSULE | Refills: 0 | Status: SHIPPED | OUTPATIENT
Start: 2022-05-17

## 2022-05-17 ASSESSMENT — PAIN SCALES - GENERAL: PAINLEVEL: NO PAIN (0)

## 2022-05-17 NOTE — NURSING NOTE
"Chief Complaint   Patient presents with     Medication Follow-up       Initial BP (!) 152/115 (BP Location: Left arm, Patient Position: Chair, Cuff Size: Adult Large)   Pulse 115   Temp 98.1  F (36.7  C) (Tympanic)   Ht 1.6 m (5' 3\")   Wt 93 kg (205 lb)   LMP 05/03/2022   SpO2 95%   BMI 36.31 kg/m   Estimated body mass index is 36.31 kg/m  as calculated from the following:    Height as of this encounter: 1.6 m (5' 3\").    Weight as of this encounter: 93 kg (205 lb).  Medication Reconciliation: complete  Sol Villarreal LPN  "

## 2022-05-18 LAB
C TRACH DNA SPEC QL PROBE+SIG AMP: NEGATIVE
N GONORRHOEA DNA SPEC QL NAA+PROBE: NEGATIVE

## 2022-05-19 LAB
HCV AB SERPL QL IA: NONREACTIVE
HIV 1+2 AB+HIV1 P24 AG SERPL QL IA: NONREACTIVE

## 2022-05-23 ENCOUNTER — TELEPHONE (OUTPATIENT)
Dept: FAMILY MEDICINE | Facility: OTHER | Age: 37
End: 2022-05-23
Payer: COMMERCIAL

## 2022-06-09 ENCOUNTER — TELEPHONE (OUTPATIENT)
Dept: FAMILY MEDICINE | Facility: OTHER | Age: 37
End: 2022-06-09
Payer: COMMERCIAL

## 2022-06-09 NOTE — TELEPHONE ENCOUNTER
3:29 PM    Reason for Call: Phone Call    Description: Patient called and states that she was scheduled for a physical at her last appt with Tiarra Rao on 05/17/2022 for 06/10/2022 at 1:30pm.  explained that there is no appt scheduled and none that have been cancelled. Patient is scheduled for a physical on 07/20/2022. Patient would like to talk to Tiarra or her nurse about the appt that was made in her office on 05/17/2022. Please call patient back for further discussion.     Was an appointment offered for this call? No  If yes : Appointment type              Date    Preferred method for responding to this message: Telephone Call  What is your phone number ? [phone]    If we cannot reach you directly, may we leave a detailed response at the number you provided? Yes    Can this message wait until your PCP/provider returns, if available today? YES, advised provider is out today     Amanda Mcgill
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
## 2022-06-30 NOTE — TELEPHONE ENCOUNTER
Patient calling this afternoon stated she never got a message from [facility] nurse on 6/9/22 regarding the message below. Eugenie wants a phone call back today at [phone].

## 2022-06-30 NOTE — TELEPHONE ENCOUNTER
Called and spoke with patient, she is wanting to be seen sooner than 7-20-22 for physical. I did let her know that there is not any openings    I did let her know that if she does not receive a call back her appointment is on 7-20-22 and we will call with any changes

## 2022-07-06 NOTE — TELEPHONE ENCOUNTER
Called and lvm for pt that there is no other date available any earlier that what pt has already scheduled.

## 2022-07-06 NOTE — TELEPHONE ENCOUNTER
Per Tiarra: Notify Pt we are unable to see her sooner as I am on vacation. Please offer her to see a covering provider.

## 2022-07-20 ENCOUNTER — OFFICE VISIT (OUTPATIENT)
Dept: FAMILY MEDICINE | Facility: OTHER | Age: 37
End: 2022-07-20
Attending: NURSE PRACTITIONER
Payer: COMMERCIAL

## 2022-07-20 VITALS
DIASTOLIC BLOOD PRESSURE: 100 MMHG | WEIGHT: 200 LBS | OXYGEN SATURATION: 99 % | BODY MASS INDEX: 36.8 KG/M2 | HEART RATE: 99 BPM | TEMPERATURE: 96.9 F | SYSTOLIC BLOOD PRESSURE: 150 MMHG | HEIGHT: 62 IN | RESPIRATION RATE: 20 BRPM

## 2022-07-20 DIAGNOSIS — R53.83 FATIGUE, UNSPECIFIED TYPE: ICD-10-CM

## 2022-07-20 DIAGNOSIS — Z00.00 HEALTH MAINTENANCE EXAMINATION: Primary | ICD-10-CM

## 2022-07-20 DIAGNOSIS — F33.1 MAJOR DEPRESSIVE DISORDER, RECURRENT EPISODE, MODERATE (H): ICD-10-CM

## 2022-07-20 DIAGNOSIS — N92.0 MENORRHAGIA WITH REGULAR CYCLE: ICD-10-CM

## 2022-07-20 DIAGNOSIS — Z72.0 TOBACCO ABUSE: ICD-10-CM

## 2022-07-20 DIAGNOSIS — N97.9 FEMALE INFERTILITY: ICD-10-CM

## 2022-07-20 DIAGNOSIS — G43.011 INTRACTABLE MIGRAINE WITHOUT AURA AND WITH STATUS MIGRAINOSUS: ICD-10-CM

## 2022-07-20 DIAGNOSIS — N94.6 DYSMENORRHEA: ICD-10-CM

## 2022-07-20 DIAGNOSIS — Z80.3 FAMILY HISTORY OF MALIGNANT NEOPLASM OF BREAST: ICD-10-CM

## 2022-07-20 DIAGNOSIS — Z12.31 ENCOUNTER FOR SCREENING MAMMOGRAM FOR BREAST CANCER: ICD-10-CM

## 2022-07-20 DIAGNOSIS — R03.0 ELEVATED BLOOD PRESSURE READING WITHOUT DIAGNOSIS OF HYPERTENSION: ICD-10-CM

## 2022-07-20 DIAGNOSIS — F41.9 ANXIETY: ICD-10-CM

## 2022-07-20 DIAGNOSIS — R06.83 SNORES: ICD-10-CM

## 2022-07-20 PROCEDURE — 99213 OFFICE O/P EST LOW 20 MIN: CPT | Mod: 25 | Performed by: NURSE PRACTITIONER

## 2022-07-20 PROCEDURE — 99395 PREV VISIT EST AGE 18-39: CPT | Performed by: NURSE PRACTITIONER

## 2022-07-20 RX ORDER — BUPROPION HYDROCHLORIDE 150 MG/1
150 TABLET ORAL EVERY MORNING
Qty: 90 TABLET | Refills: 0 | Status: SHIPPED | OUTPATIENT
Start: 2022-07-20

## 2022-07-20 RX ORDER — LOSARTAN POTASSIUM 25 MG/1
25 TABLET ORAL DAILY
Qty: 90 TABLET | Refills: 0 | Status: SHIPPED | OUTPATIENT
Start: 2022-07-20

## 2022-07-20 ASSESSMENT — ENCOUNTER SYMPTOMS
COUGH: 0
HEMATOCHEZIA: 0
JOINT SWELLING: 0
CHILLS: 0
NAUSEA: 0
SORE THROAT: 0
PARESTHESIAS: 0
CONSTIPATION: 0
SHORTNESS OF BREATH: 0
EYE PAIN: 0
FREQUENCY: 0
WEAKNESS: 0
DIARRHEA: 0
BREAST MASS: 0
ABDOMINAL PAIN: 1
ARTHRALGIAS: 0
PALPITATIONS: 0
FEVER: 0
HEARTBURN: 1
HEMATURIA: 0
MYALGIAS: 0
DYSURIA: 0
HEADACHES: 1
DIZZINESS: 0
NERVOUS/ANXIOUS: 0

## 2022-07-20 ASSESSMENT — PAIN SCALES - GENERAL: PAINLEVEL: MODERATE PAIN (5)

## 2022-07-20 NOTE — NURSING NOTE
"Chief Complaint   Patient presents with     Physical     Complete physical          Initial BP (!) 150/100   Pulse 99   Temp 96.9  F (36.1  C) (Tympanic)   Resp 20   Ht 1.562 m (5' 1.5\")   Wt 90.7 kg (200 lb)   SpO2 99%   BMI 37.18 kg/m   Estimated body mass index is 37.18 kg/m  as calculated from the following:    Height as of this encounter: 1.562 m (5' 1.5\").    Weight as of this encounter: 90.7 kg (200 lb).  Medication Reconciliation: complete  Terri Martinez LPN    "

## 2022-07-20 NOTE — NURSING NOTE
"Chief Complaint   Patient presents with     Physical     Complete physical          Initial There were no vitals taken for this visit. Estimated body mass index is 36.31 kg/m  as calculated from the following:    Height as of 5/17/22: 1.6 m (5' 3\").    Weight as of 5/17/22: 93 kg (205 lb).  Medication Reconciliation: complete  Sol Villarreal LPN  "

## 2022-07-25 ENCOUNTER — HOSPITAL ENCOUNTER (OUTPATIENT)
Dept: ULTRASOUND IMAGING | Facility: HOSPITAL | Age: 37
Discharge: HOME | End: 2022-07-25
Attending: NURSE PRACTITIONER | Admitting: NURSE PRACTITIONER
Payer: COMMERCIAL

## 2022-07-25 DIAGNOSIS — N97.9 FEMALE INFERTILITY: ICD-10-CM

## 2022-07-25 PROCEDURE — 76856 US EXAM PELVIC COMPLETE: CPT

## 2022-08-02 PROBLEM — E66.9 OBESITY (BMI 30-39.9): Status: ACTIVE | Noted: 2022-07-20

## 2022-08-03 ENCOUNTER — OFFICE VISIT (OUTPATIENT)
Dept: OBGYN | Facility: OTHER | Age: 37
End: 2022-08-03
Attending: OBSTETRICS & GYNECOLOGY
Payer: COMMERCIAL

## 2022-08-03 VITALS
TEMPERATURE: 98.5 F | BODY MASS INDEX: 36.8 KG/M2 | RESPIRATION RATE: 16 BRPM | WEIGHT: 200 LBS | SYSTOLIC BLOOD PRESSURE: 151 MMHG | DIASTOLIC BLOOD PRESSURE: 102 MMHG | HEART RATE: 114 BPM | OXYGEN SATURATION: 99 % | HEIGHT: 62 IN

## 2022-08-03 DIAGNOSIS — I10 ESSENTIAL HYPERTENSION, BENIGN: ICD-10-CM

## 2022-08-03 DIAGNOSIS — N97.9 FEMALE INFERTILITY: Primary | ICD-10-CM

## 2022-08-03 DIAGNOSIS — E66.9 OBESITY (BMI 30-39.9): ICD-10-CM

## 2022-08-03 DIAGNOSIS — Z12.4 SCREENING FOR MALIGNANT NEOPLASM OF CERVIX: ICD-10-CM

## 2022-08-03 DIAGNOSIS — E66.01 MORBID OBESITY (H): ICD-10-CM

## 2022-08-03 PROCEDURE — 87624 HPV HI-RISK TYP POOLED RSLT: CPT | Mod: ZL | Performed by: OBSTETRICS & GYNECOLOGY

## 2022-08-03 PROCEDURE — 99204 OFFICE O/P NEW MOD 45 MIN: CPT | Performed by: OBSTETRICS & GYNECOLOGY

## 2022-08-03 PROCEDURE — G0463 HOSPITAL OUTPT CLINIC VISIT: HCPCS | Performed by: OBSTETRICS & GYNECOLOGY

## 2022-08-03 PROCEDURE — G0123 SCREEN CERV/VAG THIN LAYER: HCPCS | Mod: ZL | Performed by: OBSTETRICS & GYNECOLOGY

## 2022-08-03 ASSESSMENT — PAIN SCALES - GENERAL: PAINLEVEL: NO PAIN (0)

## 2022-08-03 NOTE — NURSING NOTE
"Chief Complaint   Patient presents with     Consult     Infertility        Initial BP (!) 151/102 (BP Location: Left arm, Patient Position: Sitting, Cuff Size: Adult Regular)   Pulse 114   Ht 1.562 m (5' 1.5\")   Wt 90.7 kg (200 lb)   LMP 07/20/2022   SpO2 99%   BMI 37.18 kg/m   Estimated body mass index is 37.18 kg/m  as calculated from the following:    Height as of this encounter: 1.562 m (5' 1.5\").    Weight as of this encounter: 90.7 kg (200 lb).  Medication Reconciliation: complete     Taryn Cortez LPN    "

## 2022-08-03 NOTE — PROGRESS NOTES
GYN CONSULT NOTE     CHIEF COMPLAINT / REASON FOR VISIT  Patient presents for Gynecology consultation at the request of her primary provider, Dr. Tiarra Rao, for infertility.    HISTORY OF PRESENT ILLNESS  Eugenie Aguero is a 37 year old  with Patient's last menstrual period was 2022. who presents for Gynecology consultation for infertility.  Menses are regular each month lasting 3-7 days.  She has some heavier bleeding for the first couple days of her cycle.  No intermenstrual bleeding.  She does have dysmenorrhea which is managed with ibuprofen. The patient has been trying to conceive for 1 year. The patient's prior pregnancy history is SAB x2, IAB x1.    The patient smokes tobacco 1/2 pack/day.  She is on Wellbutrin for depression, anxiety and panic attacks.  The patient has hypertension and takes Propranolol.  She was recently prescribed Losartan as antihypertensive but she has not started the medication yet.    MENSTRUAL HISTORY  Menarche was at age 12.  Menses: regular q 28-30 days.  Menses last: 3-7 days.  Intermenstrual bleeding: Denies.  Dysmenorrhea: Yes controlled with ibuprofen.  She is sexually active and denies issues with intercourse.   Dyspareunia: Denies.  Postcoital spotting: Denies.  Current contraception: none.  History of  IUD use: No  STD History: Chlamydia.  PID History: No  Last Pap smear history: Normal.  Mammogram history: None.  Galactorrhea: No  MAURA exposure: No  Hirsuitism: No  Intolerance to hot or cold: No  Significant change in weight within last year: No    PAST INFERTILITY EVALUATION AND TREATMENT:  Patient's work up has included:  Ultrasound: Yes, 2022  HSG: No.  Laparoscopy: No     Past infertility treatment included:  None.    Partner is 30 years old. No history of trauma to the genitalia, medications, tobacco, drug use. No prior semen analysis. He has fathered 3 children.    ALLERGIES     Allergies   Allergen Reactions     Seasonal Allergies       Seasonal allergies     Sulfa Drugs      Tetanus Toxoids Unknown     Hard lump forms in arm     MEDICATIONS    Current Outpatient Medications:      buPROPion (WELLBUTRIN XL) 150 MG 24 hr tablet, Take 1 tablet (150 mg) by mouth every morning, Disp: 90 tablet, Rfl: 0     losartan (COZAAR) 25 MG tablet, Take 1 tablet (25 mg) by mouth daily, Disp: 90 tablet, Rfl: 0     propranolol ER (INDERAL LA) 60 MG 24 hr capsule, Take 1 capsule (60 mg) by mouth daily, Disp: 90 capsule, Rfl: 0    REVIEW OF SYSTEMS  As per the HPI, otherwise negative.    Past Medical History:   Diagnosis Date     Anxiety      Bilateral carpal tunnel syndrome 11/3/2015     Depression with anxiety 2013     Depressive disorder      Elevated blood pressure reading without diagnosis of hypertension 2016     Essential hypertension, benign 2022     Female infertility 8/3/2022     Intractable migraine without aura and with status migrainosus 2016     Major depressive disorder, recurrent episode, moderate (H) 11/3/2015     Obesity (BMI 30-39.9) 2022     Panic attack 2013     Tobacco abuse 2021     Past Surgical History:   Procedure Laterality Date     RELEASE CARPAL TUNNEL Right 2015    Procedure: RELEASE CARPAL TUNNEL;  Surgeon: Keith Dove MD;  Location: [facility]     OB History    Para Term  AB Living   3 0 0 0 3 0   SAB IAB Ectopic Multiple Live Births   2 1 0 0 0      # Outcome Date GA Lbr Chivo/2nd Weight Sex Delivery Anes PTL Lv   3 2014     SAB   FD   2 2012     SAB   FD   1 IAB 2007     IAB   FD     Social History     Tobacco Use     Smoking status: Current Every Day Smoker     Packs/day: 0.50     Years: 10.00     Pack years: 5.00     Types: Cigarettes     Smokeless tobacco: Never Used   Substance Use Topics     Alcohol use: No     History   Sexual Activity     Sexual activity: Yes     Partners: Male     Birth control/ protection: None     Family History   Problem Relation Age of Onset      "Breast Cancer Mother 50     Skin Cancer Mother         unsure type     Atrial fibrillation Mother      Hypertension Mother      Kidney Disease Brother      Breast Cancer Maternal Grandmother      OBJECTIVE  BP (!) 151/102 (BP Location: Left arm, Patient Position: Sitting, Cuff Size: Adult Regular)   Pulse 114   Temp 98.5  F (36.9  C)   Resp 16   Ht 1.562 m (5' 1.5\")   Wt 90.7 kg (200 lb)   LMP 07/20/2022   SpO2 99%   BMI 37.18 kg/m      General:  Well-developed, well-nourished female obese in no apparent distress.  Neurological: Alert and oriented x3.  Lungs:  Clear to auscultation bilaterally with good inspiratory effort.  No wheezing rhonchi or rales noted. Breathing nonlabored.  Heart:  Regular rate and rhythm without murmur. No JVD.  No peripheral vascular disease.  Abdomen: Soft, obese, nontender, nondistended, positive bowel sounds.  No organomegaly. No rebound, no guarding.  Pelvic exam:  Normal external female genitalia without lesions or abnormalities. Normal pubic hair distribution. Urethral meatus normal in appearance and without masses. Normal Bartholin, Urethral and Cullison's glands. Vaginal mucosa is pink and moist with a small amount of physiologic discharge. No vaginal lesions.  Normal multiparous cervix without lesions or abnormalities. No cervical motion tenderness to palpation. The bladder and urethra are nontender to palpation. Uterus is normal size, shape, consistency, mid position, mobile, and nontender. Uterine descent is minimal.  No adnexal masses or tenderness bilaterally.  Exam is limited by body habitus.  Rectal exam:  No external hemorrhoids noted. No rectovaginal nodularity noted. Examination confirms the vaginal exam.  Extremities:  No clubbing cyanosis or edema. Nontender bilaterally.     Chaperone: Taryn Cortez LPN    DIAGNOSTICS  05/17/2022 HIV negative, Hepatitis C negative, Chlamydia negative, Gonorrhea negative  05/17/2022 BMP normal  05/17/2022 WBC 12.3, Hgb 14.6, " Hematocrit 43.8, Plt 278  2022 TSH 2.64  2022 Pap pending    2022 Pelvic ultrasound: Uterus: 6.4 x 4.3 x 5.3 cm. Endometrium: 11.9 mm in thickness. Right Ovary: 3.4 x 2.0 x 1.9 cm. Normal blood flow. Left Ovary:  3.1 x 1.9 x 3.7 cm. Normal blood flow. UTERUS: The uterus is normal in size and contour. The endometrium is normal in thickness. There is no myometrial mass. ADNEXA: The ovaries are normal in size. There is no adnexal mass. MISC: No free fluid.  IMPRESSION:   Normal examination.    ASSESSMENT / PLAN  Eugenie Aguero is a 37 year old  female who presents in consultation for infertility.    1 Infertility, female  2 Hypertension  3 Obesity BMI 37  4 Tobacco abuse  5 Depression and anxiety    - I discussed infertility with the patient.  She has secondary infertility with a history of an elective  and 2 first semester miscarriages. She has been having regular unprotected intercourse for the last 1 years without pregnancy.  Has a regular 28-30 day cycle which indicates regular ovulation.  - I discussed that infertility evaluation and infertility treatments are usually not covered by medical insurance.  - Pelvic ultrasound shows normal uterus, endometrium, and ovaries bilaterally.  - Her partner has 3 children from a previous relationship which makes it less likely for male factor but does not rule out male factor.  - I recommend checking a day 3 FSH, LH, TSH, Prolactin, and AMH levels.  - I recommend checking a day 21 progesterone level.  - I recommend a hysterosalpingogram to evaluate the endometrial cavity and fallopian tube patency.  - I recommend that the patient's partner do a semen analysis.  - I strongly recommend that the patient see her primary provider for blood pressure management.  Her blood pressure will need to be under good control before proceeding with any ovulation induction medications.  - I strongly encouraged tobacco cessation.  - I discussed the limited  infertility management options available at a small facility like ours. I discussed Clomid ovulation induction and timed intercourse.  I reviewed the risk, benefits, alternatives, and expected outcomes of Clomid ovulation induction and timed intercourse.  I discussed the risk of multiple gestation as well as ovarian cyst and pelvic pain.  - I discussed timed intercourse with the patient.  - I recommend the patient use ovulation predictor kits.  - I discussed techniques for timed intercourse including maximizing sperm counts for at least 3 days prior to expected ovulation, and the benefits of female orgasm on becoming pregnant.  - The patient asked appropriate questions and these were answered for her.    - Problem list reviewed and updated.  - I recommend the patient contact my clinic with the first day of her next menses so that we can check day 3 labs, day 21 lab, and schedule hysterosalpingogram.    Isaac Theodore MD  Obstetrics and Gynecology      CC: PRIMARY CARE PROVIDER: Tiarra Nunez

## 2022-08-09 LAB
BKR LAB AP GYN ADEQUACY: NORMAL
BKR LAB AP GYN INTERPRETATION: NORMAL
BKR LAB AP GYN OTHER FINDINGS: NORMAL
BKR LAB AP HPV REFLEX: NORMAL
BKR LAB AP LMP: NORMAL
BKR LAB AP PREVIOUS ABNORMAL: NORMAL
PATH REPORT.COMMENTS IMP SPEC: NORMAL
PATH REPORT.COMMENTS IMP SPEC: NORMAL
PATH REPORT.RELEVANT HX SPEC: NORMAL

## 2022-08-12 LAB
HUMAN PAPILLOMA VIRUS 16 DNA: NEGATIVE
HUMAN PAPILLOMA VIRUS 18 DNA: NEGATIVE
HUMAN PAPILLOMA VIRUS FINAL DIAGNOSIS: NORMAL
HUMAN PAPILLOMA VIRUS OTHER HR: NEGATIVE

## 2022-08-18 ENCOUNTER — TELEPHONE (OUTPATIENT)
Dept: OBGYN | Facility: OTHER | Age: 37
End: 2022-08-18

## 2022-08-18 DIAGNOSIS — N97.9 FEMALE INFERTILITY: Primary | ICD-10-CM

## 2022-08-19 ENCOUNTER — LAB (OUTPATIENT)
Dept: LAB | Facility: OTHER | Age: 37
End: 2022-08-19
Payer: COMMERCIAL

## 2022-08-19 ENCOUNTER — TELEPHONE (OUTPATIENT)
Dept: OBGYN | Facility: OTHER | Age: 37
End: 2022-08-19

## 2022-08-19 DIAGNOSIS — N97.9 FEMALE INFERTILITY: Primary | ICD-10-CM

## 2022-08-19 DIAGNOSIS — N97.9 FEMALE INFERTILITY: ICD-10-CM

## 2022-08-19 LAB
FSH SERPL IRP2-ACNC: 7.8 MIU/ML
LH SERPL-ACNC: 12.5 MIU/ML
MIS SERPL-MCNC: 3.13 NG/ML (ref 0.15–7.5)
PROGEST SERPL-MCNC: 0.1 NG/ML
PROLACTIN SERPL 3RD IS-MCNC: 9 NG/ML (ref 5–23)
TSH SERPL DL<=0.005 MIU/L-ACNC: 3.53 MU/L (ref 0.4–4)

## 2022-08-19 PROCEDURE — 36415 COLL VENOUS BLD VENIPUNCTURE: CPT | Mod: ZL

## 2022-08-19 PROCEDURE — 83001 ASSAY OF GONADOTROPIN (FSH): CPT | Mod: ZL

## 2022-08-19 PROCEDURE — 83520 IMMUNOASSAY QUANT NOS NONAB: CPT | Mod: ZL

## 2022-08-19 PROCEDURE — 84146 ASSAY OF PROLACTIN: CPT | Mod: ZL

## 2022-08-19 PROCEDURE — 83002 ASSAY OF GONADOTROPIN (LH): CPT | Mod: ZL

## 2022-08-19 PROCEDURE — 84144 ASSAY OF PROGESTERONE: CPT | Mod: ZL

## 2022-08-19 PROCEDURE — 84443 ASSAY THYROID STIM HORMONE: CPT | Mod: ZL

## 2022-08-19 NOTE — TELEPHONE ENCOUNTER
Please have the patient go to the lab today, 08/18/2022, for day 3 infertility labs. Ordered.   Please have the patient go to the lab on 09/08/2022 for day 21 infertility lab. Ordered.   Please contact radiology to schedule hysterosalpingogram.   Isaac Theodore MD

## 2022-08-19 NOTE — TELEPHONE ENCOUNTER
Pt states she missed doing her lab yesterday for CD 3 and is wondering can she do it today?  Shahrzad Zheng LPN

## 2022-08-26 ENCOUNTER — HOSPITAL ENCOUNTER (OUTPATIENT)
Dept: GENERAL RADIOLOGY | Facility: HOSPITAL | Age: 37
Discharge: HOME | End: 2022-08-26
Attending: OBSTETRICS & GYNECOLOGY | Admitting: OBSTETRICS & GYNECOLOGY
Payer: COMMERCIAL

## 2022-08-26 DIAGNOSIS — N97.9 FEMALE INFERTILITY: ICD-10-CM

## 2022-08-26 PROCEDURE — 255N000002 HC RX 255 OP 636: Performed by: OBSTETRICS & GYNECOLOGY

## 2022-08-26 PROCEDURE — 74740 X-RAY FEMALE GENITAL TRACT: CPT

## 2022-08-26 PROCEDURE — 58340 CATHETER FOR HYSTEROGRAPHY: CPT | Performed by: OBSTETRICS & GYNECOLOGY

## 2022-08-26 PROCEDURE — 58340 CATHETER FOR HYSTEROGRAPHY: CPT

## 2022-08-26 RX ORDER — IOPAMIDOL 612 MG/ML
50 INJECTION, SOLUTION INTRAVASCULAR ONCE
Status: COMPLETED | OUTPATIENT
Start: 2022-08-26 | End: 2022-08-26

## 2022-08-26 RX ADMIN — IOPAMIDOL 20 ML: 612 INJECTION, SOLUTION INTRAVENOUS at 13:38

## 2022-08-26 NOTE — PROCEDURES
Name: Eugenie Aguero  Age: 37 year old  YOB: 1985   Medical Record #: [id number]     [facility] Gynecology Procedure Note    Date: 2022   Pre-operative Diagnosis: 1. 37 year old  female with infertility.   Post-operative Diagnosis: Same.   Procedure: Hysterosalpingogram   Surgeon: Isaac Theodore MD   Anesthesia: None.   Estimated Blood Loss: None.   Specimens: None.   Findings: A normal endometrial cavity was visualized and bilateral tubal fill and spill of radiopaque dye. There was normal appearing fallopian tubes bilaterally. See radiologist interpretation for official findings.   Complications: None.   Disposition: Patient in stable condition to home.       Procedure in Detail:  I reviewed the risks, benefits, alternatives, indications, and expected outcomes of hysterosalpingogram with the patient. She verbalized understanding and desired to proceed. Informed consent was obtained. Consent form was signed.     A hard stop timeout was accomplished. Eugenie Aguero was verified and all members agreed on procedure and patient consent. The patient was positioned in dorsal lithotomy position on the radiology xray table. An open sided speculum was inserted into the vagina and the cervix was prepared with Betadine. The anterior lip of the cervix was grapsed with a single tooth tenaculum. An HSG catheter was inserted within the endocervix. The balloon was inflated. Under fluoroscopy, roughly 12 ml of omnipaque dye was injected through the HSG catheter. Images were obtained which showed a normal endometrial cavity. Bilateral tubal fill and spill of radiopaque dye was visualized. The fallopian tubes were noted to be normal appearing bilaterally. See radiologist interpretation for official findings. The procedure was then completed. The HSG catheter balloon was deflated and easily removed. The tenaculum was released from the cervix and removed. No vaginal bleeding was noted.  The patient tolerated the procedure well. All sponge, lap, needle and instrument counts were correct x 2. Results were discussed with the patient. Please see radiology report for further details.    Isaac Theodore MD  Obstetrics and Gynecology

## 2022-09-22 ENCOUNTER — LAB (OUTPATIENT)
Dept: LAB | Facility: OTHER | Age: 37
End: 2022-09-22
Payer: COMMERCIAL

## 2022-09-22 DIAGNOSIS — N97.9 FEMALE INFERTILITY: ICD-10-CM

## 2022-09-22 PROCEDURE — 84144 ASSAY OF PROGESTERONE: CPT | Mod: ZL

## 2022-09-22 PROCEDURE — 36415 COLL VENOUS BLD VENIPUNCTURE: CPT | Mod: ZL

## 2022-09-23 LAB — PROGEST SERPL-MCNC: 0.1 NG/ML

## 2022-10-26 ENCOUNTER — HOSPITAL ENCOUNTER (EMERGENCY)
Facility: HOSPITAL | Age: 37
Discharge: HOME | End: 2022-10-26
Attending: NURSE PRACTITIONER | Admitting: NURSE PRACTITIONER
Payer: COMMERCIAL

## 2022-10-26 VITALS
HEART RATE: 109 BPM | RESPIRATION RATE: 16 BRPM | TEMPERATURE: 97.7 F | OXYGEN SATURATION: 99 % | SYSTOLIC BLOOD PRESSURE: 152 MMHG | DIASTOLIC BLOOD PRESSURE: 101 MMHG

## 2022-10-26 DIAGNOSIS — J01.00 ACUTE MAXILLARY SINUSITIS: ICD-10-CM

## 2022-10-26 DIAGNOSIS — H65.192 ACUTE OTITIS MEDIA WITH EFFUSION OF LEFT EAR: Primary | ICD-10-CM

## 2022-10-26 PROCEDURE — 99213 OFFICE O/P EST LOW 20 MIN: CPT | Performed by: NURSE PRACTITIONER

## 2022-10-26 PROCEDURE — G0463 HOSPITAL OUTPT CLINIC VISIT: HCPCS

## 2022-10-26 ASSESSMENT — ENCOUNTER SYMPTOMS
SINUS PAIN: 1
SHORTNESS OF BREATH: 0
VOICE CHANGE: 0
SORE THROAT: 1
RHINORRHEA: 1
CHILLS: 0
FEVER: 0
COUGH: 1
TROUBLE SWALLOWING: 0
SINUS PRESSURE: 1

## 2022-10-27 NOTE — ED TRIAGE NOTES
"Patient presents with c/o congestion, ear, throat and head pain, and productive cough. Symptoms started 3 days ago and \"keep getting worse.\"      "

## 2022-10-27 NOTE — ED PROVIDER NOTES
History     Chief Complaint   Patient presents with     Nasal Congestion     Otalgia     Pharyngitis     HPI  Eugenie Aguero is a 37 year old female who presents to urgent care for evaluation of URI symptoms that started 3 days ago.  Patient reports nasal congestion, rhinorrhea, sinus pressure/pain, tooth pain, sore throat, postnasal drip, ear pain and headache.  She tells me that her symptoms up progressively worsening.  No chest pain or shortness of breath.  No known fevers at home.  Eating and drinking okay.  Patient has tried ibuprofen for her symptoms.  Reports history of chronic sinus issues and was advised to get surgery but she declined it.  She declines any testing for respiratory viruses or strep    Allergies:  Allergies   Allergen Reactions     Seasonal Allergies      Seasonal allergies     Sulfa Drugs      Tetanus Toxoids Unknown     Hard lump forms in arm       Problem List:    Patient Active Problem List    Diagnosis Date Noted     Female infertility 08/03/2022     Priority: Medium     Morbid obesity (H) 08/03/2022     Priority: Medium     Obesity (BMI 30-39.9) 07/20/2022     Priority: Medium     Essential hypertension, benign 05/17/2022     Priority: Medium     Tobacco abuse 09/12/2021     Priority: Medium     Elevated blood pressure reading without diagnosis of hypertension 07/13/2016     Priority: Medium     Intractable migraine without aura and with status migrainosus 07/13/2016     Priority: Medium     Anxiety 11/03/2015     Priority: Medium     Major depressive disorder, recurrent episode, moderate (H) 11/03/2015     Priority: Medium     Depression with anxiety 08/30/2013     Priority: Medium     Panic attack 08/30/2013     Priority: Medium     Family history of malignant neoplasm of breast 08/09/2007     Priority: Medium        Past Medical History:    Past Medical History:   Diagnosis Date     Anxiety      Bilateral carpal tunnel syndrome 11/3/2015     Depression with anxiety 8/30/2013      Depressive disorder      Elevated blood pressure reading without diagnosis of hypertension 7/13/2016     Essential hypertension, benign 5/17/2022     Female infertility 8/3/2022     Intractable migraine without aura and with status migrainosus 7/13/2016     Major depressive disorder, recurrent episode, moderate (H) 11/3/2015     Obesity (BMI 30-39.9) 7/20/2022     Panic attack 8/30/2013     Tobacco abuse 9/12/2021       Past Surgical History:    Past Surgical History:   Procedure Laterality Date     RELEASE CARPAL TUNNEL Right 11/4/2015    Procedure: RELEASE CARPAL TUNNEL;  Surgeon: Keith Dove MD;  Location: [facility] OR       Family History:    Family History   Problem Relation Age of Onset     Breast Cancer Mother 50     Skin Cancer Mother         unsure type     Atrial fibrillation Mother      Hypertension Mother      Kidney Disease Brother      Breast Cancer Maternal Grandmother        Social History:  Marital Status:  Single [1]  Social History     Tobacco Use     Smoking status: Every Day     Packs/day: 0.50     Years: 10.00     Pack years: 5.00     Types: Cigarettes     Smokeless tobacco: Never   Substance Use Topics     Alcohol use: No     Drug use: No        Medications:    amoxicillin-clavulanate (AUGMENTIN) 875-125 MG tablet  buPROPion (WELLBUTRIN XL) 150 MG 24 hr tablet  losartan (COZAAR) 25 MG tablet  propranolol ER (INDERAL LA) 60 MG 24 hr capsule          Review of Systems   Constitutional: Negative for chills and fever.   HENT: Positive for congestion, ear pain, postnasal drip, rhinorrhea, sinus pressure, sinus pain and sore throat. Negative for ear discharge, trouble swallowing and voice change.    Respiratory: Positive for cough. Negative for shortness of breath.    Cardiovascular: Negative for chest pain.   All other systems reviewed and are negative.      Physical Exam   BP: (!) 152/101  Pulse: 109  Temp: 97.7  F (36.5  C)  Resp: 16  SpO2: 99 %      Physical Exam  Vitals and nursing note  reviewed.   Constitutional:       Appearance: She is well-developed. She is not ill-appearing or toxic-appearing.   HENT:      Head: Atraumatic.      Jaw: No trismus.      Right Ear: Tympanic membrane and ear canal normal.      Left Ear: A middle ear effusion is present. Tympanic membrane is erythematous.      Nose: Congestion and rhinorrhea present.      Right Sinus: Maxillary sinus tenderness present.      Left Sinus: Maxillary sinus tenderness present.      Mouth/Throat:      Mouth: Mucous membranes are moist.      Pharynx: No pharyngeal swelling, oropharyngeal exudate or uvula swelling.      Tonsils: No tonsillar exudate or tonsillar abscesses.   Eyes:      Pupils: Pupils are equal, round, and reactive to light.   Cardiovascular:      Rate and Rhythm: Normal rate and regular rhythm.   Pulmonary:      Effort: Pulmonary effort is normal.      Breath sounds: Normal breath sounds.   Musculoskeletal:      Cervical back: Neck supple.   Skin:     General: Skin is warm and dry.      Capillary Refill: Capillary refill takes less than 2 seconds.   Neurological:      Mental Status: She is alert and oriented to person, place, and time.         ED Course                 Procedures              No results found for this or any previous visit (from the past 24 hour(s)).    Medications - No data to display    Assessments & Plan (with Medical Decision Making)     I have reviewed the nursing notes.    37-year-old female that presented with concerns of URI symptoms that started 3 days ago.  Heart rate and rhythm are regular.  Respirations are nonlabored.  Maxillary sinus tenderness.  She does have a left ear infection.  Vital signs within normal limits.    Discussed all findings with patient.  We will treat her left ear infection with Augmentin.  Noted that this might help with her sinus symptoms but they are likely due to a viral illness versus bacterial.  Advised her to take an oral decongestant, consider using a Breanne pot a  vaporizer.  Push fluids.  Continue Tylenol or ibuprofen as needed for pain.  Follow-up with primary medical provider as needed if no improvement in symptoms.  Return to ED/UC for worsening or concerning symptoms.    I have reviewed the findings, diagnosis, plan and need for follow up with the patient.  This document was prepared using a combination of typing and voice generated software.  While every attempt was made for accuracy, spelling and grammatical errors may exist.    New Prescriptions    AMOXICILLIN-CLAVULANATE (AUGMENTIN) 875-125 MG TABLET    Take 1 tablet by mouth 2 times daily for 10 days       Final diagnoses:   Acute otitis media with effusion of left ear   Acute maxillary sinusitis       10/26/2022   [facility]     Mpofu, Stefanieudence, CNP  10/26/22 2019

## 2022-10-27 NOTE — DISCHARGE INSTRUCTIONS
Take antibiotic as prescribed until finished.     Take an oral decongestant such as sudafed or mucinex sinus. Consider using nettipot or vaporizer.     Follow up with your doctor if no improvement in symptoms.    Return to emergency department for worsening or concerning symptoms.

## 2022-11-21 ENCOUNTER — HEALTH MAINTENANCE LETTER (OUTPATIENT)
Age: 37
End: 2022-11-21

## 2022-12-15 ENCOUNTER — HOSPITAL ENCOUNTER (EMERGENCY)
Facility: HOSPITAL | Age: 37
Discharge: HOME | End: 2022-12-15
Attending: PHYSICIAN ASSISTANT | Admitting: PHYSICIAN ASSISTANT
Payer: COMMERCIAL

## 2022-12-15 ENCOUNTER — APPOINTMENT (OUTPATIENT)
Dept: GENERAL RADIOLOGY | Facility: HOSPITAL | Age: 37
End: 2022-12-15
Attending: PHYSICIAN ASSISTANT
Payer: COMMERCIAL
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
## 2022-12-15 DIAGNOSIS — S50.02XA CONTUSION OF LEFT ELBOW, INITIAL ENCOUNTER: ICD-10-CM

## 2022-12-15 PROCEDURE — 73090 X-RAY EXAM OF FOREARM: CPT | Mod: LT

## 2022-12-15 PROCEDURE — 99213 OFFICE O/P EST LOW 20 MIN: CPT | Performed by: PHYSICIAN ASSISTANT

## 2022-12-15 PROCEDURE — G0463 HOSPITAL OUTPT CLINIC VISIT: HCPCS

## 2022-12-15 ASSESSMENT — ENCOUNTER SYMPTOMS
EYE REDNESS: 0
VOMITING: 0
FACIAL SWELLING: 0
COUGH: 0
FEVER: 0

## 2022-12-15 ASSESSMENT — ACTIVITIES OF DAILY LIVING (ADL): ADLS_ACUITY_SCORE: 35

## 2022-12-15 NOTE — ED TRIAGE NOTES
Slipped getting out of car fell onto left fore arm . CMS intact pt able to move arm but pain ful no deformity noted       Triage Assessment     Row Name 12/15/22 [id number]       Triage Assessment (Adult)    Airway WDL WDL       Respiratory WDL    Respiratory WDL WDL       Skin Circulation/Temperature WDL    Skin Circulation/Temperature WDL WDL       Cardiac WDL    Cardiac WDL WDL       Peripheral/Neurovascular WDL    Peripheral Neurovascular WDL WDL       Cognitive/Neuro/Behavioral WDL    Cognitive/Neuro/Behavioral WDL WDL

## 2022-12-15 NOTE — ED PROVIDER NOTES
History     Chief Complaint   Patient presents with     Arm Injury     HPI  Eugenie Aguero is a 37 year old female who was getting out of her car when she slipped on the ice landing on her outstretched left forearm area.  She has some pain to palpation over the proximal third of her forearm.  She is able to flex and extend her elbow as well as her wrist with no difficulty.  She has a small abrasion to the elbow as well.  Denies any other injury she is right-hand dominant.  Rates her pain as 8/10.    Allergies:  Allergies   Allergen Reactions     Seasonal Allergies      Seasonal allergies     Sulfa Drugs      Tetanus Toxoids Unknown     Hard lump forms in arm       Problem List:    Patient Active Problem List    Diagnosis Date Noted     Female infertility 08/03/2022     Priority: Medium     Morbid obesity (H) 08/03/2022     Priority: Medium     Obesity (BMI 30-39.9) 07/20/2022     Priority: Medium     Essential hypertension, benign 05/17/2022     Priority: Medium     Tobacco abuse 09/12/2021     Priority: Medium     Elevated blood pressure reading without diagnosis of hypertension 07/13/2016     Priority: Medium     Intractable migraine without aura and with status migrainosus 07/13/2016     Priority: Medium     Anxiety 11/03/2015     Priority: Medium     Major depressive disorder, recurrent episode, moderate (H) 11/03/2015     Priority: Medium     Depression with anxiety 08/30/2013     Priority: Medium     Panic attack 08/30/2013     Priority: Medium     Family history of malignant neoplasm of breast 08/09/2007     Priority: Medium        Past Medical History:    Past Medical History:   Diagnosis Date     Anxiety      Bilateral carpal tunnel syndrome 11/3/2015     Depression with anxiety 8/30/2013     Depressive disorder      Elevated blood pressure reading without diagnosis of hypertension 7/13/2016     Essential hypertension, benign 5/17/2022     Female infertility 8/3/2022     Intractable migraine without  aura and with status migrainosus 7/13/2016     Major depressive disorder, recurrent episode, moderate (H) 11/3/2015     Obesity (BMI 30-39.9) 7/20/2022     Panic attack 8/30/2013     Tobacco abuse 9/12/2021       Past Surgical History:    Past Surgical History:   Procedure Laterality Date     RELEASE CARPAL TUNNEL Right 11/4/2015    Procedure: RELEASE CARPAL TUNNEL;  Surgeon: Keith Dove MD;  Location: [facility]       Family History:    Family History   Problem Relation Age of Onset     Breast Cancer Mother 50     Skin Cancer Mother         unsure type     Atrial fibrillation Mother      Hypertension Mother      Kidney Disease Brother      Breast Cancer Maternal Grandmother        Social History:  Marital Status:  Single [1]  Social History     Tobacco Use     Smoking status: Every Day     Packs/day: 0.50     Years: 10.00     Pack years: 5.00     Types: Cigarettes     Smokeless tobacco: Never   Substance Use Topics     Alcohol use: No     Drug use: No        Medications:    buPROPion (WELLBUTRIN XL) 150 MG 24 hr tablet  losartan (COZAAR) 25 MG tablet  propranolol ER (INDERAL LA) 60 MG 24 hr capsule          Review of Systems   Constitutional: Negative for fever.   HENT: Negative for drooling and facial swelling.    Eyes: Negative for redness.   Respiratory: Negative for cough.    Gastrointestinal: Negative for vomiting.   Musculoskeletal:        Left forearm injury and pain       Physical Exam          Physical Exam  Vitals and nursing note reviewed.   Constitutional:       General: She is not in acute distress.     Appearance: Normal appearance. She is not ill-appearing or toxic-appearing.   HENT:      Head: Normocephalic.      Nose: Nose normal.   Eyes:      General: No scleral icterus.     Extraocular Movements: Extraocular movements intact.   Neck:      Trachea: No tracheal deviation.   Cardiovascular:      Rate and Rhythm: Normal rate.   Pulmonary:      Effort: Pulmonary effort is normal. No respiratory  distress.      Breath sounds: No stridor.   Musculoskeletal:         General: Normal range of motion.      Cervical back: Normal range of motion.      Comments: Left lateral elbow with some swelling and tenderness to palpation.  She has full active and passive range of motion with her elbow and her wrist.  She has good distal pulses and neurologically is intact distally.   Skin:     General: Skin is warm and dry.      Coloration: Skin is not jaundiced or pale.   Neurological:      General: No focal deficit present.      Mental Status: She is alert and oriented to person, place, and time.   Psychiatric:         Attention and Perception: Attention normal.         Mood and Affect: Mood normal.         ED Course            Results for orders placed or performed during the hospital encounter of 12/15/22 (from the past 24 hour(s))   Radius/Ulna XR,  PA &LAT, left    Narrative    Exam: XR FOREARM LEFT 2 VIEWS    Technique: Left forearm, 2 views    Comparison: None.    Exam reason: slipped and fell on wrist. cms intact, rom limited.    Findings:  No acute fracture or dislocation. Joint spaces are well maintained.      Soft tissues appear normal.      Impression    Impression:  No acute fracture or dislocation.    ESMER MOORE MD         SYSTEM ID:  [id number]       Medications - No data to display    Assessments & Plan (with Medical Decision Making)     I have reviewed the nursing notes.    I have reviewed the findings, diagnosis, plan and need for follow up with the patient.      New Prescriptions    No medications on file       Final diagnoses:   Contusion of left elbow, initial encounter   Fall due to slipping on ice or snow, initial encounter     Eugenie Aguero is a 37 year old female who was getting out of her car when she slipped on the ice landing on her outstretched left forearm area.  She has some pain to palpation over the proximal third of her forearm.  She is able to flex and extend her elbow as well as  her wrist with no difficulty.  She has a small abrasion to the elbow as well.  Denies any other injury she is right-hand dominant.  Rates her pain as 8/10.  Afebrile.  Vital signs stable.  Examining her Left lateral elbow with some swelling and tenderness to palpation.  She has full active and passive range of motion with her elbow and her wrist.  She has good distal pulses and neurologically is intact distally.  X-rays show no obvious fracture or deformity.  She does have that soft tissue bruising and tenderness.  She was offered a sling but declined the need for this.  She will just use over-the-counter Tylenol and ibuprofen as needed.  Continue to monitor symptoms I discussed my concerns for possible ligament damage.  She should gradually improve with time otherwise if no improvement she will need to follow-up with her primary care provider or orthopedics for further evaluation.  Follow-up sooner if there is any other concerns problems or questions          12/15/2022   [facility]     Tank Nicole PA-C  12/15/22 1670

## 2022-12-15 NOTE — ED TRIAGE NOTES
Pt presents with c/o slipping and hurting left forearm.cms intact. Able to move elbow, and wrist. Wrist has limited mobility, but no pain in the wrist itself.  Pain is only in the forearm  Said she has a hx of a break in her arm just unsure of what arm since she was really young.    Pain 8/10

## 2023-04-05 ENCOUNTER — NURSE TRIAGE (OUTPATIENT)
Dept: FAMILY MEDICINE | Facility: OTHER | Age: 38
End: 2023-04-05

## 2023-04-05 NOTE — TELEPHONE ENCOUNTER
"  Pt calling and wanting an ABX for infected cat scratch.    Pt states it is more like a puncture from cat claw that occurred three days ago. Dime size, red, warm,clear drainage. Area is  near her mouth on her left cheek.  Denies fever.  Advised her to go to .She verbalized understanding.    Keisha An RN    Reason for Disposition    [1] Looks infected AND [2] large red area (> 2 inches or 5 cm) or streak    Additional Information    Negative: Wound causes numbness (i.e., loss of sensation)    Negative: Wound causes weakness (i.e., decreased ability to move hand, finger, toe)    Negative: [1] Bleeding AND [2] won't stop after 10 minutes of direct pressure (using correct technique)    Negative: Skin is split open or gaping (or length > 1/2 inch or 12 mm on the skin, 1/4 inch or 6 mm on the face)    Negative: [1] Deep cut AND [2] can see bone or tendons    Negative: Cut over knuckle (MCP joint)    Negative: Sensation of something in the wound (i.e., retained object in wound)    Negative: [1] Dirt in the wound AND [2] not removed with 15 minutes of scrubbing    Negative: [1] Animal bite AND [2] broken skin    Negative: [1] Human bite AND [2] broken skin    Negative: Puncture wound    Negative: [1] SEVERE pain AND [2] not improved 2 hours after pain medicine    Answer Assessment - Initial Assessment Questions  1. APPEARANCE of INJURY: \"What does the injury look like?\"       By mouth on left cheeck  2. SIZE: \"How large is the cut?\"       Cat nail went into cheek -it is a puncture a few days ago,  3. BLEEDING: \"Is it bleeding now?\" If Yes, ask: \"Is it difficult to stop?\"       no  4. LOCATION: \"Where is the injury located?\"         5. ONSET: \"How long ago did the injury occur?\"      Three days days ago it-it is red and swollen, little drainage-clear and no fever.red around it and it feels like something is in her cheek and warm  6. MECHANISM: \"Tell me how it happened.\"       Cat claw puncture  7. TETANUS: \"When was " "the last tetanus booster?\"      no  8. PREGNANCY: \"Is there any chance you are pregnant?\" \"When was your last menstrual period?\"    Protocols used: CUTS AND [id number]      "

## 2023-09-17 ENCOUNTER — HEALTH MAINTENANCE LETTER (OUTPATIENT)
Age: 38
End: 2023-09-17

## 2023-09-22 ENCOUNTER — HOSPITAL ENCOUNTER (EMERGENCY)
Facility: HOSPITAL | Age: 38
Discharge: HOME | End: 2023-09-22
Attending: EMERGENCY MEDICINE | Admitting: EMERGENCY MEDICINE
Payer: MEDICAID

## 2023-09-22 VITALS
OXYGEN SATURATION: 97 % | TEMPERATURE: 97.2 F | HEART RATE: 108 BPM | DIASTOLIC BLOOD PRESSURE: 107 MMHG | RESPIRATION RATE: 16 BRPM | SYSTOLIC BLOOD PRESSURE: 167 MMHG

## 2023-09-22 DIAGNOSIS — J02.9 VIRAL PHARYNGITIS: ICD-10-CM

## 2023-09-22 LAB — GROUP A STREP BY PCR: NOT DETECTED

## 2023-09-22 PROCEDURE — 99283 EMERGENCY DEPT VISIT LOW MDM: CPT | Performed by: EMERGENCY MEDICINE

## 2023-09-22 PROCEDURE — 99284 EMERGENCY DEPT VISIT MOD MDM: CPT

## 2023-09-22 PROCEDURE — 87651 STREP A DNA AMP PROBE: CPT | Performed by: EMERGENCY MEDICINE

## 2023-09-22 PROCEDURE — 96372 THER/PROPH/DIAG INJ SC/IM: CPT | Performed by: EMERGENCY MEDICINE

## 2023-09-22 PROCEDURE — 250N000011 HC RX IP 250 OP 636: Mod: JZ | Performed by: EMERGENCY MEDICINE

## 2023-09-22 RX ORDER — KETOROLAC TROMETHAMINE 30 MG/ML
30 INJECTION, SOLUTION INTRAMUSCULAR; INTRAVENOUS ONCE
Status: COMPLETED | OUTPATIENT
Start: 2023-09-22 | End: 2023-09-22

## 2023-09-22 RX ORDER — DEXAMETHASONE SODIUM PHOSPHATE 10 MG/ML
10 INJECTION, SOLUTION INTRAMUSCULAR; INTRAVENOUS ONCE
Status: COMPLETED | OUTPATIENT
Start: 2023-09-22 | End: 2023-09-22

## 2023-09-22 RX ADMIN — DEXAMETHASONE SODIUM PHOSPHATE 10 MG: 10 INJECTION, SOLUTION INTRAMUSCULAR; INTRAVENOUS at 03:27

## 2023-09-22 RX ADMIN — KETOROLAC TROMETHAMINE 30 MG: 30 INJECTION, SOLUTION INTRAMUSCULAR; INTRAVENOUS at 03:27

## 2023-09-22 ASSESSMENT — ENCOUNTER SYMPTOMS
SHORTNESS OF BREATH: 0
COUGH: 0
FEVER: 0
CHILLS: 0

## 2023-09-22 ASSESSMENT — ACTIVITIES OF DAILY LIVING (ADL): ADLS_ACUITY_SCORE: 35

## 2023-09-22 NOTE — ED NOTES
Discharge instructions gone over with patient and she states understanding. Discharged in stable condition, ambulatory. States starting to feel a little relief.

## 2023-09-22 NOTE — ED PROVIDER NOTES
History     Chief Complaint   Patient presents with    Pharyngitis     HPI  Eugenie Aguero is a 38 year old female who is here with sore throat.  3 days.  Getting worse.  Now has runny nose and congestion.  Feels warm but no measured temperatures at home.  Sick contacts noted.    Allergies:  Allergies   Allergen Reactions    Seasonal Allergies      Seasonal allergies    Sulfa Antibiotics     Tetanus Toxoids Unknown     Hard lump forms in arm       Problem List:    Patient Active Problem List    Diagnosis Date Noted    Female infertility 08/03/2022     Priority: Medium    Morbid obesity (H) 08/03/2022     Priority: Medium    Obesity (BMI 30-39.9) 07/20/2022     Priority: Medium    Essential hypertension, benign 05/17/2022     Priority: Medium    Tobacco abuse 09/12/2021     Priority: Medium    Elevated blood pressure reading without diagnosis of hypertension 07/13/2016     Priority: Medium    Intractable migraine without aura and with status migrainosus 07/13/2016     Priority: Medium    Anxiety 11/03/2015     Priority: Medium    Major depressive disorder, recurrent episode, moderate (H) 11/03/2015     Priority: Medium    Depression with anxiety 08/30/2013     Priority: Medium    Panic attack 08/30/2013     Priority: Medium    Family history of malignant neoplasm of breast 08/09/2007     Priority: Medium        Past Medical History:    Past Medical History:   Diagnosis Date    Anxiety     Bilateral carpal tunnel syndrome 11/3/2015    Depression with anxiety 8/30/2013    Depressive disorder     Elevated blood pressure reading without diagnosis of hypertension 7/13/2016    Essential hypertension, benign 5/17/2022    Female infertility 8/3/2022    Intractable migraine without aura and with status migrainosus 7/13/2016    Major depressive disorder, recurrent episode, moderate (H) 11/3/2015    Obesity (BMI 30-39.9) 7/20/2022    Panic attack 8/30/2013    Tobacco abuse 9/12/2021       Past Surgical History:    Past  Surgical History:   Procedure Laterality Date    RELEASE CARPAL TUNNEL Right 11/4/2015    Procedure: RELEASE CARPAL TUNNEL;  Surgeon: Keith Dove MD;  Location: [facility]       Family History:    Family History   Problem Relation Age of Onset    Breast Cancer Mother 50    Skin Cancer Mother         unsure type    Atrial fibrillation Mother     Hypertension Mother     Kidney Disease Brother     Breast Cancer Maternal Grandmother        Social History:  Marital Status:  Single [1]  Social History     Tobacco Use    Smoking status: Every Day     Packs/day: 0.50     Years: 10.00     Pack years: 5.00     Types: Cigarettes    Smokeless tobacco: Never   Substance Use Topics    Alcohol use: No    Drug use: No        Medications:    buPROPion (WELLBUTRIN XL) 150 MG 24 hr tablet  losartan (COZAAR) 25 MG tablet  propranolol ER (INDERAL LA) 60 MG 24 hr capsule          Review of Systems   Constitutional:  Negative for chills and fever.   Respiratory:  Negative for cough and shortness of breath.    All other systems reviewed and are negative.      Physical Exam   BP: (!) 167/107  Pulse: 108  Temp: 97.2  F (36.2  C)  Resp: 16  SpO2: 97 %      Physical Exam  Constitutional:       General: She is not in acute distress.     Appearance: She is not diaphoretic.   HENT:      Head: Normocephalic and atraumatic.      Right Ear: External ear normal.      Left Ear: External ear normal.      Nose: No congestion or rhinorrhea.      Mouth/Throat:      Pharynx: Oropharynx is clear. Uvula midline. No pharyngeal swelling, oropharyngeal exudate or posterior oropharyngeal erythema.      Tonsils: No tonsillar exudate or tonsillar abscesses.   Eyes:      General: No scleral icterus.     Pupils: Pupils are equal, round, and reactive to light.   Cardiovascular:      Rate and Rhythm: Normal rate and regular rhythm.      Heart sounds: Normal heart sounds.   Pulmonary:      Effort: No respiratory distress.      Breath sounds: Normal breath sounds.    Abdominal:      General: Bowel sounds are normal.      Palpations: Abdomen is soft.      Tenderness: There is no abdominal tenderness.   Musculoskeletal:         General: No tenderness.      Cervical back: Normal range of motion and neck supple.      Right lower leg: No edema.      Left lower leg: No edema.   Skin:     General: Skin is warm.      Capillary Refill: Capillary refill takes less than 2 seconds.      Findings: No rash.   Neurological:      Mental Status: Mental status is at baseline.      Cranial Nerves: No cranial nerve deficit.   Psychiatric:         Mood and Affect: Mood normal.         Behavior: Behavior normal.         ED Course                 Procedures             Critical Care time:               Results for orders placed or performed during the hospital encounter of 09/22/23 (from the past 24 hour(s))   Group A Streptococcus PCR Throat Swab    Specimen: Throat; Swab   Result Value Ref Range    Group A strep by PCR Not Detected Not Detected    Narrative    The Xpert Xpress Strep A test, performed on the [employer], is a rapid, qualitative in vitro diagnostic test for the detection of Streptococcus pyogenes (Group A ß-hemolytic Streptococcus, Strep A) in throat swab specimens from patients with signs and symptoms of pharyngitis. The Xpert Xpress Strep A test can be used as an aid in the diagnosis of Group A Streptococcal pharyngitis. The assay is not intended to monitor treatment for Group A Streptococcus infections. The Xpert Xpress Strep A test utilizes an automated real-time polymerase chain reaction (PCR) to detect Streptococcus pyogenes DNA.       Medications   dexAMETHasone PF (DECADRON) injection 10 mg (10 mg Intramuscular $Given 9/22/23 0327)   ketorolac (TORADOL) injection 30 mg (30 mg Intramuscular $Given 9/22/23 0327)       Assessments & Plan (with Medical Decision Making)     I have reviewed the nursing notes.    I have reviewed the findings, diagnosis, plan and  need for follow up with the patient.          Medical Decision Making  The patient's presentation was of .    The patient's evaluation involved:  ordering and/or review of 1 test(s) in this encounter (see separate area of note for details)    The patient's management necessitated moderate risk (prescription drug management including medications given in the ED).    38-year-old female here with sore throat for 3 days.  Strep negative.  Refused COVID test.  No red flags to warrant advanced imaging.  Low suspicion for PTA given none seen on exam, low suspicion for retropharyngeal abscess, low suspicion for Eloy's angina given absence of fever and no reassuring appearance.    Discharge Medication List as of 9/22/2023  3:41 AM          Final diagnoses:   Viral pharyngitis       9/22/2023   [facility]       Rajan Bonilla MD  09/22/23 [id number]

## 2023-09-22 NOTE — ED TRIAGE NOTES
Pt states she has had an increasingly sore throat for the past few days. Pt denies any fevers or chills. Pt states she has not had any known exposure to COVID, however she has had a possible exposure to strep. Pt states she took some ibuprofen prior to coming in and it helped with the pain.

## 2023-09-22 NOTE — ED NOTES
States she developed a sore throat 3-4 days ago and also states that she may have had strep exposure a few days ago.

## 2023-09-23 ENCOUNTER — HOSPITAL ENCOUNTER (EMERGENCY)
Facility: HOSPITAL | Age: 38
Discharge: HOME | End: 2023-09-23
Attending: PHYSICIAN ASSISTANT | Admitting: PHYSICIAN ASSISTANT
Payer: MEDICAID

## 2023-09-23 VITALS
BODY MASS INDEX: 35.43 KG/M2 | RESPIRATION RATE: 20 BRPM | OXYGEN SATURATION: 98 % | DIASTOLIC BLOOD PRESSURE: 112 MMHG | HEART RATE: 104 BPM | WEIGHT: 199.96 LBS | HEIGHT: 63 IN | TEMPERATURE: 97.6 F | SYSTOLIC BLOOD PRESSURE: 161 MMHG

## 2023-09-23 DIAGNOSIS — H10.33 ACUTE BACTERIAL CONJUNCTIVITIS OF BOTH EYES: ICD-10-CM

## 2023-09-23 DIAGNOSIS — J06.9 UPPER RESPIRATORY TRACT INFECTION, UNSPECIFIED TYPE: ICD-10-CM

## 2023-09-23 PROCEDURE — 99213 OFFICE O/P EST LOW 20 MIN: CPT | Performed by: PHYSICIAN ASSISTANT

## 2023-09-23 PROCEDURE — 250N000009 HC RX 250: Performed by: PHYSICIAN ASSISTANT

## 2023-09-23 PROCEDURE — G0463 HOSPITAL OUTPT CLINIC VISIT: HCPCS

## 2023-09-23 RX ORDER — ERYTHROMYCIN 5 MG/G
0.5 OINTMENT OPHTHALMIC 3 TIMES DAILY
Qty: 7.5 G | Refills: 0 | Status: SHIPPED | OUTPATIENT
Start: 2023-09-23 | End: 2023-09-28

## 2023-09-23 RX ORDER — FLUCONAZOLE 150 MG/1
150 TABLET ORAL ONCE
Qty: 1 TABLET | Refills: 0 | Status: SHIPPED | OUTPATIENT
Start: 2023-09-23 | End: 2023-09-23

## 2023-09-23 RX ORDER — TETRACAINE HYDROCHLORIDE 5 MG/ML
1-2 SOLUTION OPHTHALMIC ONCE
Status: COMPLETED | OUTPATIENT
Start: 2023-09-23 | End: 2023-09-23

## 2023-09-23 RX ADMIN — TETRACAINE HYDROCHLORIDE 2 DROP: 5 SOLUTION OPHTHALMIC at 18:36

## 2023-09-23 ASSESSMENT — VISUAL ACUITY
OS: 20/30
OD: 20/25

## 2023-09-23 NOTE — ED TRIAGE NOTES
Patient presents with complaints of pink eye, states it started in one eye last night now in both this morning.

## 2023-09-23 NOTE — ED TRIAGE NOTES
HOSPITALIST  PROGRESS NOTE:    Patient: Kaya Pablo Attending: Lesli Warren MD   : 1942 Date: 2018 7:52 AM   AGE: 76 year old Current Hospital Day: Hospital Day: 8   SEX:  female       CHIEF COMPLAINT  :   Altered mental status      OVERNIGHT/INTERVAL ISSUES  :  None         SUBJECTIVE:   Patient states she has been eating well. She denies a history of low blood sugars and states her symptoms have been present since she got here. The symptoms she is experiencing with a low blood sugars is feeling shaky. She denies a history of diabetes. She has not been having her blood sugars checked at the nursing home. She states she has been sleeping well here. She has noticed some nausea off and on recently since being diagnosed with a UTI about a week ago. She denies any chest pain, shortness of breath or heartburn. She does have some pain with her coccygeal stage IV ulcer, but it is under control with her prn medications.  she denies any trouble with diarrhea or constipation, but periodically feels like she has to have a bowel movement but then she can go. This has been going on intermittently even prior to admission. Her last bowel movement was last night.             OBJECTIVE:                   Telemetry:   NSR (personally reviewed) with lots of artifact.       Intake/Output :        I&O Last 24 hours:   Intake/Output Summary (Last 24 hours) at 18 0752  Last data filed at 18 0600   Gross per 24 hour   Intake              720 ml po   Output             1800 ml; 2 stools   Net            -1080 ml       Last Stool Occurrence: 1 (18)    Vitals 24 Hour Range Last Value   Temperature Temp  Min: 97.2 °F (36.2 °C)  Max: 98.3 °F (36.8 °C) 97.8 °F (36.6 °C) (18 0700)   Pulse Pulse  Min: 79  Max: 107 102 (18 0700)   Respiratory Resp  Min: 16  Max: 20 16 (18 07)   Non-Invasive Blood Pressure BP  Min: 96/51  Max: 136/62 134/60 (18 0700)   Pulse Oximetry SpO2  Min: 97 %   Pt presents with c/o bilateral eye redness, drainage, and irritation. Sx started last night. Pt has been using clear eye eye drops for comfort. Denies trauma or injury to eyes. Denies foreign objects in eyes.          Max: 100 % 100 % (05/21/18 0700)   FiO2   on room air       Vital Most Recent Value First Value   Weight 135 kg Weight: 116 kg   Height 5' 4\" (162.6 cm) Height: 5' 4\" (162.6 cm)           Scheduled Medications:   • cholestyramine/aspartame  4 g Oral Daily   • nicotinic acid  500 mg Oral Nightly   • rivaroxaban  20 mg Oral Daily with dinner   • famotidine  20 mg Oral 2 times per day   • polyethylene glycol  17 g Oral Daily   • gabapentin  200 mg Oral QHS   • sodium chloride (PF)  2 mL Injection 2 times per day   • albuterol-ipratropium 2.5 mg/0.5 mg  3 mL Nebulization 4x Daily Resp   • calcium carbonate-vitamin D  1 tablet Oral Daily   • docusate sodium-sennosides  2 tablet Oral Daily   • guaiFENesin  1,200 mg Oral 2 times per day   • lidocaine   Topical BID   • pantoprazole  40 mg Oral QAM AC   • tiotropium  18 mcg Inhalation Daily Resp   • tiZANidine  4 mg Oral Nightly       Continuous Infusions:   • dextrose 5 % / sodium chloride 0.9% infusion (since 1630 yesterday) 100 mL/hr at 05/21/18 0554       Physical Exam :   General-Alert and oriented to place and self.  She states it is March, 2018.   Lungs-clear to auscultation throat. No wheezes, rhonchi, rubs, or crackles.  Heart-regular rate and rhythm without murmurs, gallops, rubs or  Abdomen-obese, soft, nontender. Good bowel sounds in all 4 quadrants. Exam was limited due to her morbid obesity.  Extremities-3+ bilateral lower leg edema. She had 2+ right ankle/dorsal pedal and 2 minus left ankle/dorsal pedal edema. There was some mild pink erythema on both heels including the proximal plantar aspect of the foot. She had a large amount of bruising and soft tissue swelling along her left forearm. PICC line site itself looked fine without erythema or drainage or swelling.   -Duarte catheter draining light yellow clear urine without blood or sediment.       Laboratory Results :    Recent Labs  Lab 05/21/18  0520 05/20/18  0600 05/19/18  0528 05/18/18  0430  05/17/18 2043 05/17/18  0526  05/15/18  0438   SODIUM  --  140  --   --   --  140  --  142   POTASSIUM  --  3.7  --   --  4.0 3.8  < > 3.7   CHLORIDE  --  110*  --   --   --  109*  --  107   CO2  --  23  --   --   --  22  --  25   BUN  --  22*  --   --   --  16  --  23*   CREATININE 0.73 0.79 0.75 0.70  --  0.69  --  0.83   GLUCOSE  --  79  --   --   --  157*  --  91   CALCIUM  --  7.6*  --   --   --  7.2*  --  7.3*   ANIONGAP  --  11  --   --   --  13  --  14   MG  --   --   --  2.1 1.8 2.0  < >  --    ALBUMIN  --   --   --   --   --  2.4*  --  1.4*   AST  --   --   --   --   --  11  --  25   GPT  --   --   --   --   --  22  --  22   BILIRUBIN  --   --   --   --   --  0.7  --  1.1*   < > = values in this interval not displayed.      Recent Labs  Lab 05/19/18  0528 05/18/18  1700 05/18/18 0430 05/17/18 0526 05/16/18  0428   WBC  --   --  10.2  --  7.2  --  12.2*   HGB 8.0* 9.0* 7.9*  < > 7.1*  < > 8.8*   HCT 24.9* 27.9* 24.9*  < > 22.4*  < > 26.9*   PLT  --   --  334  --  306  --  387   < > = values in this interval not displayed.      Recent Labs  Lab 05/20/18  0859 05/20/18  1247 05/20/18  1409 05/20/18  1417 05/20/18  1551 05/20/18  1754 05/20/18  2107 05/20/18  2208 05/21/18  0258 05/21/18  0334 05/21/18  0522 05/21/18  0557   GLUCOSE BEDSIDE 73 68 66 71 62* 72 93 102* 61* 112* 79 80           Cultures  :  Blood cultures from admission on 5/15/18 negative and now finalized.          ASSESSMENT/PLAN  :      1. Unresponsive episode-leading to this admission. Resolved. Etiology not clear. Possibly related to hypoglycemia (blood sugars are 62-65 in the emergency room upon admission) versus a UTI. Her mental status is back to normal.  Stroke workup was negative.    2. Catheter-associated Escherichia coli UTI vs colonization-seen by Dr. Huggins yesterday  recommended stopping antibiotics.    3. Hypoglycemia-patient continues to have low blood pressures and has been maintained on IV dextrose containing fluids for  most of her stay. At this point, will have Dr. Rodgers evaluate. Cortisol level was normal when checked on the morning of 5/16/18. She did receive stress dose steroids for a few days earlier in her stay.    4. Acute on chronic anemia-hemoglobin was 11.1 upon admission and now 8.0. Her lowest hemoglobin thus far was 7.1 on 5/17/18 during this stay. Prior to admission, her last hemoglobin was 10.0 (4/6/18-Care Everywhere-). She has not received a transfusion while here. A  B-12  Levelwas checked earlier in her stay and came back > 1000; her folate level was 16.2. Partial iron studies were obtained showing a TIBC of 258 and an iron saturation of 24%. Will recheck her CBC today along with a reticulocyte count and ferritin as her hemoglobin has not been checked for several days.  She did receive one dose of IV Venofer while here (5/18/18); no PRBC's given during this stay; hemoglobin has trended up on its own.  Stool negative for occult blood.    5. Stage IV coccygeal ulcer-continue wound care per Dr. Luong/Domingo Mendez. They will be reviewing her dressing changes on MWF while here. This wound does not look infected.    6. History of PE/upper extremity DVT-continue Xarelto.    7. CAD/COPD-stable without symptoms. Continue medical management.    8. Morbid obesity was generalized deconditioning-continue PT and OT as tolerated.    9. History of ZACHARY but not using CPAP.     10. History of MRSA/MDRO-continue contact isolation.      MISCELLANEOUS  :    Patient to remain in the hospital to get to the bottom of her hypoglycemia before return to [facility]..       Best practice:  DVT prophylaxis:Xarelto  Duarte in Place: Yes,  stage IV coccygeal ulcer.  Central Line: Yes, medically necessary--LUE PICC line    Code Status:     Full Resuscitation      Plan of care discussed with patient, the , the patient's nurse (Linda) and Dr. Handy who was previously caring for this patient.   Starting  tomorrow, Dr. Garcia will assume attending care of this patient.    Lesli Warren MD  5/21/2018  7:52 AM

## 2023-09-23 NOTE — DISCHARGE INSTRUCTIONS
Take the Augmentin as prescribed for your URI.   Use the eye drops as prescribed for your conjunctivitis.   Alternate between Ibuprofen and Tylenol every 4 hours for fever control.  Increase fluids and rest.  Use a humidifier at night.  Return here with any difficulty breathing or new/concerning symptoms.

## 2023-09-23 NOTE — ED PROVIDER NOTES
History     Chief Complaint   Patient presents with    Eye Problem     HPI  Eugenie Aguero is a 38 year old female who was seen here 2 days ago for URI symptoms who presents today with worsening cough, sore throat, and now bilateral eye redness and drainage. No fevers/chills. Eyes were crusted shut this am. She does not wear contact.     Allergies:  Allergies   Allergen Reactions    Seasonal Allergies      Seasonal allergies    Sulfa Antibiotics     Tetanus Toxoids Unknown     Hard lump forms in arm       Problem List:    Patient Active Problem List    Diagnosis Date Noted    Female infertility 08/03/2022     Priority: Medium    Morbid obesity (H) 08/03/2022     Priority: Medium    Obesity (BMI 30-39.9) 07/20/2022     Priority: Medium    Essential hypertension, benign 05/17/2022     Priority: Medium    Tobacco abuse 09/12/2021     Priority: Medium    Elevated blood pressure reading without diagnosis of hypertension 07/13/2016     Priority: Medium    Intractable migraine without aura and with status migrainosus 07/13/2016     Priority: Medium    Anxiety 11/03/2015     Priority: Medium    Major depressive disorder, recurrent episode, moderate (H) 11/03/2015     Priority: Medium    Depression with anxiety 08/30/2013     Priority: Medium    Panic attack 08/30/2013     Priority: Medium    Family history of malignant neoplasm of breast 08/09/2007     Priority: Medium        Past Medical History:    Past Medical History:   Diagnosis Date    Anxiety     Bilateral carpal tunnel syndrome 11/3/2015    Depression with anxiety 8/30/2013    Depressive disorder     Elevated blood pressure reading without diagnosis of hypertension 7/13/2016    Essential hypertension, benign 5/17/2022    Female infertility 8/3/2022    Intractable migraine without aura and with status migrainosus 7/13/2016    Major depressive disorder, recurrent episode, moderate (H) 11/3/2015    Obesity (BMI 30-39.9) 7/20/2022    Panic attack 8/30/2013     "Tobacco abuse 9/12/2021       Past Surgical History:    Past Surgical History:   Procedure Laterality Date    RELEASE CARPAL TUNNEL Right 11/4/2015    Procedure: RELEASE CARPAL TUNNEL;  Surgeon: Keith Dove MD;  Location: [facility] OR       Family History:    Family History   Problem Relation Age of Onset    Breast Cancer Mother 50    Skin Cancer Mother         unsure type    Atrial fibrillation Mother     Hypertension Mother     Kidney Disease Brother     Breast Cancer Maternal Grandmother        Social History:  Marital Status:  Single [1]  Social History     Tobacco Use    Smoking status: Every Day     Packs/day: 0.50     Years: 10.00     Pack years: 5.00     Types: Cigarettes    Smokeless tobacco: Never   Substance Use Topics    Alcohol use: No    Drug use: No        Medications:    amoxicillin-clavulanate (AUGMENTIN) 875-125 MG tablet  erythromycin (ROMYCIN) 5 MG/GM ophthalmic ointment  fluconazole (DIFLUCAN) 150 MG tablet  buPROPion (WELLBUTRIN XL) 150 MG 24 hr tablet  losartan (COZAAR) 25 MG tablet  propranolol ER (INDERAL LA) 60 MG 24 hr capsule          Review of Systems   All other systems reviewed and are negative.      Physical Exam   BP: (!) 161/112 (states my B/P is always high when I'm here)  Pulse: 104  Temp: 97.6  F (36.4  C)  Resp: 20  Height: 160 cm (5' 3\")  Weight: 90.7 kg (199 lb 15.3 oz)  SpO2: 98 %      Physical Exam  Vitals and nursing note reviewed.   Constitutional:       General: She is not in acute distress.     Appearance: She is well-developed. She is not diaphoretic.   HENT:      Head: Normocephalic and atraumatic.      Right Ear: External ear normal.      Left Ear: External ear normal.      Nose: Nose normal.      Mouth/Throat:      Pharynx: Oropharynx is clear. Uvula midline. Posterior oropharyngeal erythema present. No oropharyngeal exudate.      Tonsils: No tonsillar exudate or tonsillar abscesses. 0 on the right. 0 on the left.   Eyes:      General: No scleral icterus.        Right " eye: No discharge.         Left eye: No discharge.      Conjunctiva/sclera:      Right eye: Right conjunctiva is injected. Exudate present. No chemosis or hemorrhage.     Left eye: Left conjunctiva is injected. Exudate present. No chemosis or hemorrhage.     Pupils: Pupils are equal, round, and reactive to light.      Right eye: Pupil is round, reactive and not sluggish. No corneal abrasion or fluorescein uptake.      Left eye: Pupil is round, reactive and not sluggish. No corneal abrasion or fluorescein uptake.   Cardiovascular:      Rate and Rhythm: Normal rate and regular rhythm.      Heart sounds: Normal heart sounds. No murmur heard.     No friction rub. No gallop.   Pulmonary:      Effort: Pulmonary effort is normal. No respiratory distress.      Breath sounds: Normal breath sounds. No wheezing or rales.   Chest:      Chest wall: No tenderness.   Abdominal:      General: Bowel sounds are normal.      Palpations: Abdomen is soft.      Tenderness: There is no abdominal tenderness.   Musculoskeletal:      Cervical back: Normal range of motion and neck supple.   Lymphadenopathy:      Cervical: No cervical adenopathy.   Skin:     General: Skin is warm and dry.      Capillary Refill: Capillary refill takes less than 2 seconds.      Coloration: Skin is not pale.      Findings: No erythema or rash.   Neurological:      Mental Status: She is alert and oriented to person, place, and time.      Cranial Nerves: No cranial nerve deficit.      Coordination: Coordination normal.   Psychiatric:         Behavior: Behavior normal.         Thought Content: Thought content normal.         Judgment: Judgment normal.         ED Course                 Procedures       No results found for this or any previous visit (from the past 24 hour(s)).    Medications   tetracaine (PONTOCAINE) 0.5 % ophthalmic solution 1-2 drop (2 drops Both Eyes $Given 9/23/23 6650)       Assessments & Plan (with Medical Decision Making)   Pt with worsening  URI sx's now going on for over a week. Bilateral conjunctivitis with yellow drainage, crusted shut this am. No fluorescein uptake on exam to suggest corneal ulcers or abrasions. She would like an antibiotic at this time, aware it may still be viral. RX for Augmentin was provided and erythromycin ointment for eyes. PRN Fluconazole for yeast infection. She was discharged home following in stable condition.     Plan: Take the Augmentin as prescribed for your URI.   Use the eye drops as prescribed for your conjunctivitis.   Alternate between Ibuprofen and Tylenol every 4 hours for fever control.  Increase fluids and rest.  Use a humidifier at night.  Return here with any difficulty breathing or new/concerning symptoms.       I have reviewed the nursing notes.    I have reviewed the findings, diagnosis, plan and need for follow up with the patient.      New Prescriptions    AMOXICILLIN-CLAVULANATE (AUGMENTIN) 875-125 MG TABLET    Take 1 tablet by mouth 2 times daily    ERYTHROMYCIN (ROMYCIN) 5 MG/GM OPHTHALMIC OINTMENT    Place 0.5 inches into both eyes 3 times daily for 5 days    FLUCONAZOLE (DIFLUCAN) 150 MG TABLET    Take 1 tablet (150 mg) by mouth once for 1 dose       Final diagnoses:   Upper respiratory tract infection, unspecified type   Acute bacterial conjunctivitis of both eyes       9/23/2023   [facility]

## 2024-02-01 ENCOUNTER — NURSE TRIAGE (OUTPATIENT)
Dept: FAMILY MEDICINE | Facility: OTHER | Age: 39
End: 2024-02-01

## 2024-02-01 ENCOUNTER — TELEPHONE (OUTPATIENT)
Dept: FAMILY MEDICINE | Facility: OTHER | Age: 39
End: 2024-02-01

## 2024-02-01 DIAGNOSIS — B37.31 YEAST INFECTION OF THE VAGINA: Primary | ICD-10-CM

## 2024-02-01 NOTE — TELEPHONE ENCOUNTER
2:20 PM    Reason for Call: Phone Call    Description: Patient calling wondering if Tiarra Rao can send in a prescription for yeast infection?     Was an appointment offered for this call? No  If yes : Appointment type              Date    Preferred method for responding to this message: Telephone Call  What is your phone number ? [phone]    If we cannot reach you directly, may we leave a detailed response at the number you provided? Yes    Can this message wait until your PCP/provider returns, if available today? YES, No

## 2024-02-01 NOTE — TELEPHONE ENCOUNTER
"Reason for Disposition   Symptoms of a yeast infection (i.e., itchy, white discharge, not bad smelling) and feels like prior vaginal yeast infections    Answer Assessment - Initial Assessment Questions  1. SYMPTOM: \"What's the main symptom you're concerned about?\" (e.g., pain, itching, dryness)      itching  2. LOCATION: \"Where is the     located?\" (e.g., inside/outside, left/right)      inside  3. ONSET: \"When did the      start?\"      2 days ago  4. PAIN: \"Is there any pain?\" If Yes, ask: \"How bad is it?\" (Scale: 1-10; mild, moderate, severe)    -  MILD (1-3): Doesn't interfere with normal activities.     -  MODERATE (4-7): Interferes with normal activities (e.g., work or school) or awakens from sleep.      -  SEVERE (8-10): Excruciating pain, unable to do any normal activities.      no  5. ITCHING: \"Is there any itching?\" If Yes, ask: \"How bad is it?\" (Scale: 1-10; mild, moderate, severe)      5/10  6. CAUSE: \"What do you think is causing the discharge?\" \"Have you had the same problem before? What happened then?\"      Yeast   has had in the past  same symptoms  states she has tried OTC medication in the past but she usually has PCP order her something, it works better.  Send to [facility] in [place].  7. OTHER SYMPTOMS: \"Do you have any other symptoms?\" (e.g., fever, itching, vaginal bleeding, pain with urination, injury to genital area, vaginal foreign body)      Itching  discharge is white  8. PREGNANCY: \"Is there any chance you are pregnant?\" \"When was your last menstrual period?\"      no    Protocols used: Vaginal Symptoms-A-[place]    "

## 2024-02-02 RX ORDER — FLUCONAZOLE 150 MG/1
150 TABLET ORAL ONCE
Qty: 1 TABLET | Refills: 0 | Status: SHIPPED | OUTPATIENT
Start: 2024-02-02 | End: 2024-02-02

## 2024-02-28 ENCOUNTER — TELEPHONE (OUTPATIENT)
Dept: FAMILY MEDICINE | Facility: OTHER | Age: 39
End: 2024-02-28

## 2024-02-28 ENCOUNTER — NURSE TRIAGE (OUTPATIENT)
Dept: FAMILY MEDICINE | Facility: OTHER | Age: 39
End: 2024-02-28

## 2024-02-28 NOTE — TELEPHONE ENCOUNTER
Symptom or reason needing to speak to RN: Serve pain in her neck      Best number to return call: [phone]     Best time to return call: Anytime

## 2024-02-28 NOTE — TELEPHONE ENCOUNTER
"Can patient be overbooked today?        Reason for Disposition   Numbness in an arm or hand (i.e., loss of sensation)    Answer Assessment - Initial Assessment Questions  1. ONSET: \"When did the pain begin?\"       4 days ago  2. LOCATION: \"Where does it hurt?\"       Back of neck  upperdown to shoulders  3. PATTERN \"Does the pain come and go, or has it been constant since it started?\"       constant  4. SEVERITY: \"How bad is the pain?\"  (Scale 1-10; or mild, moderate, severe)    - NO PAIN (0): no pain or only slight stiffness     - MILD (1-3): doesn't interfere with normal activities     - MODERATE (4-7): interferes with normal activities or awakens from sleep     - SEVERE (8-10):  excruciating pain, unable to do any normal activities       Pain varies throughout the day depends on if she is turning her head or sitting  5. RADIATION: \"Does the pain go anywhere else, shoot into your arms?\"      no  6. CORD SYMPTOMS: \"Any weakness or numbness of the arms or legs?\"      Left hand is numb off and on  7. CAUSE: \"What do you think is causing the neck pain?\"      unknown  8. NECK OVERUSE: \"Any recent activities that involved turning or twisting the neck?\"      no  9. OTHER SYMPTOMS: \"Do you have any other symptoms?\" (e.g., headache, fever, chest pain, difficulty breathing, neck swelling)      Headache   10. PREGNANCY: \"Is there any chance you are pregnant?\" \"When was your last menstrual period?\"        no    Protocols used: Neck Pain or Mreaqylqy-A-[facility]    "

## 2024-10-08 ENCOUNTER — OFFICE VISIT (OUTPATIENT)
Dept: FAMILY MEDICINE | Facility: OTHER | Age: 39
End: 2024-10-08
Attending: STUDENT IN AN ORGANIZED HEALTH CARE EDUCATION/TRAINING PROGRAM
Payer: COMMERCIAL

## 2024-10-08 VITALS
TEMPERATURE: 97.8 F | RESPIRATION RATE: 15 BRPM | SYSTOLIC BLOOD PRESSURE: 152 MMHG | HEIGHT: 63 IN | WEIGHT: 186.2 LBS | OXYGEN SATURATION: 98 % | DIASTOLIC BLOOD PRESSURE: 106 MMHG | HEART RATE: 101 BPM | BODY MASS INDEX: 32.99 KG/M2

## 2024-10-08 DIAGNOSIS — M77.12 LATERAL EPICONDYLITIS OF LEFT ELBOW: Primary | ICD-10-CM

## 2024-10-08 DIAGNOSIS — M67.911 TENDINOPATHY OF RIGHT ROTATOR CUFF: ICD-10-CM

## 2024-10-08 DIAGNOSIS — F33.1 MAJOR DEPRESSIVE DISORDER, RECURRENT EPISODE, MODERATE (H): ICD-10-CM

## 2024-10-08 DIAGNOSIS — R03.0 ELEVATED BLOOD PRESSURE READING WITHOUT DIAGNOSIS OF HYPERTENSION: ICD-10-CM

## 2024-10-08 DIAGNOSIS — F41.9 ANXIETY: ICD-10-CM

## 2024-10-08 DIAGNOSIS — I10 ESSENTIAL HYPERTENSION: ICD-10-CM

## 2024-10-08 DIAGNOSIS — M75.41 IMPINGEMENT SYNDROME, SHOULDER, RIGHT: ICD-10-CM

## 2024-10-08 PROCEDURE — G0463 HOSPITAL OUTPT CLINIC VISIT: HCPCS | Performed by: STUDENT IN AN ORGANIZED HEALTH CARE EDUCATION/TRAINING PROGRAM

## 2024-10-08 PROCEDURE — 99214 OFFICE O/P EST MOD 30 MIN: CPT | Performed by: STUDENT IN AN ORGANIZED HEALTH CARE EDUCATION/TRAINING PROGRAM

## 2024-10-08 RX ORDER — BUPROPION HYDROCHLORIDE 150 MG/1
150 TABLET ORAL EVERY MORNING
Qty: 90 TABLET | Refills: 0 | Status: SHIPPED | OUTPATIENT
Start: 2024-10-08

## 2024-10-08 RX ORDER — PROPRANOLOL HYDROCHLORIDE 60 MG/1
60 CAPSULE, EXTENDED RELEASE ORAL DAILY
Qty: 90 CAPSULE | Refills: 0 | Status: SHIPPED | OUTPATIENT
Start: 2024-10-08

## 2024-10-08 RX ORDER — LOSARTAN POTASSIUM 25 MG/1
25 TABLET ORAL DAILY
Qty: 90 TABLET | Refills: 0 | Status: SHIPPED | OUTPATIENT
Start: 2024-10-08

## 2024-10-08 ASSESSMENT — PAIN SCALES - GENERAL: PAINLEVEL: MODERATE PAIN (5)

## 2024-10-08 ASSESSMENT — PATIENT HEALTH QUESTIONNAIRE - PHQ9
SUM OF ALL RESPONSES TO PHQ QUESTIONS 1-9: 15
SUM OF ALL RESPONSES TO PHQ QUESTIONS 1-9: 15
10. IF YOU CHECKED OFF ANY PROBLEMS, HOW DIFFICULT HAVE THESE PROBLEMS MADE IT FOR YOU TO DO YOUR WORK, TAKE CARE OF THINGS AT HOME, OR GET ALONG WITH OTHER PEOPLE: VERY DIFFICULT

## 2024-10-08 NOTE — PROGRESS NOTES
"  Assessment & Plan     Elevated blood pressure reading without diagnosis of hypertension  Refill  - losartan (COZAAR) 25 MG tablet; Take 1 tablet (25 mg) by mouth daily.    Essential hypertension  BP very high today.  On Propranolol.  Needs adjustment to BP medication  Recommend follow-up with PCP in 1-2 weeks  - propranolol ER (INDERAL LA) 60 MG 24 hr capsule; Take 1 capsule (60 mg) by mouth daily.    Anxiety  Stable  - buPROPion (WELLBUTRIN XL) 150 MG 24 hr tablet; Take 1 tablet (150 mg) by mouth every morning.    Major depressive disorder, recurrent episode, moderate (H)  Stable  - buPROPion (WELLBUTRIN XL) 150 MG 24 hr tablet; Take 1 tablet (150 mg) by mouth every morning.    Lateral epicondylitis of left elbow  Clinical history and physical exam consistent with this  Positive Mill test  Will send home with elbow brace.    - Wrist/Arm/Hand Bracing Supplies Order Tennis Elbow Arm Band; Left    Impingement syndrome, shoulder, right  Positive provocative tests including: Right shoulder pain with forward flexion and abduction. Positive empty can test, positive lift off test,  positive Neer's, and positive Tse-Silvio test, positive Yergason's test  There is definitely rotator cuff tendinopathy and impingement present  Patient is willing to try PT first for this  - Physical Therapy  Referral; Future    Tendinopathy of right rotator cuff  See above.    - Wrist/Arm/Hand Bracing Supplies Order Tennis Elbow Arm Band; Left  - Physical Therapy  Referral; Future          Nicotine/Tobacco Cessation  She reports that she has been smoking cigarettes. She has a 5 pack-year smoking history. She has been exposed to tobacco smoke. She has never used smokeless tobacco.  Nicotine/Tobacco Cessation Plan  Deferred to PCP      BMI  Estimated body mass index is 32.98 kg/m  as calculated from the following:    Height as of this encounter: 1.6 m (5' 3\").    Weight as of this encounter: 84.5 kg (186 lb 3.2 oz). " "        No follow-ups on file.    Subjective   Eugenie is a 39 year old, presenting for the following health issues:  Elbow Pain and Shoulder Pain    Elbow Pain    Shoulder Pain       Thought she bruised her shoulder.    Left elbow hurts.    Right shoulder- cannot hold or lift anything  Right handed so if using right arm too much then it start to get really sore.    No neck pain  Right shoulder pain does not radiate.  Biceps groove hurts.    Does home care-     Concern - Right shoulder pain  Onset: 1-2 weeks   Description: sharp pain any time she lifts the arm  Intensity: moderate  Progression of Symptoms:  same  Accompanying Signs & Symptoms: weakness  Previous history of similar problem: no  Precipitating factors:        Worsened by: lifting things  Alleviating factors:        Improved by: rest  Therapies tried and outcome: None    Concern - Left elbow pain  Onset: 1-2 weeks   Description: moving or lifting with that arm causes pain  Intensity: moderate  Progression of Symptoms:  same  Accompanying Signs & Symptoms: weakness  Previous history of similar problem: none  Precipitating factors:        Worsened by: lifting   Alleviating factors:        Improved by: none  Therapies tried and outcome: None      Review of Systems  Constitutional, HEENT, cardiovascular, pulmonary, GI, , musculoskeletal, neuro, skin, endocrine and psych systems are negative, except as otherwise noted.      Objective    BP (!) 152/106 (BP Location: Right arm, Patient Position: Sitting, Cuff Size: Adult Regular)   Pulse 101   Temp 97.8  F (36.6  C) (Tympanic)   Resp 15   Ht 1.6 m (5' 3\")   Wt 84.5 kg (186 lb 3.2 oz)   LMP 09/24/2024 (Approximate)   SpO2 98%   BMI 32.98 kg/m    Body mass index is 32.98 kg/m .  Physical Exam   GENERAL: alert and no distress  EYES: Eyes grossly normal to inspection, PERRL and conjunctivae and sclerae normal  HENT: ear canals and TM's normal, nose and mouth without ulcers or lesions  NECK: no " adenopathy, no asymmetry, masses, or scars  RESP: lungs clear to auscultation - no rales, rhonchi or wheezes  CV: regular rate and rhythm, normal S1 S2, no S3 or S4, no murmur, click or rub, no peripheral edema  ABDOMEN: soft, nontender, no hepatosplenomegaly, no masses and bowel sounds normal  MS: no gross musculoskeletal defects noted, no edema.  Right shoulder pain with forward flexion and abduction. Positive empty can test, positive lift off test,  positive Neer's, and positive Tse-Silvio test, positive Yergason's test  SKIN: no suspicious lesions or rashes  NEURO: Normal strength and tone, mentation intact and speech normal  PSYCH: mentation appears normal, affect normal/bright          Signed Electronically by: Rose Marie Flor MD    Answers submitted by the patient for this visit:  Patient Health Questionnaire (Submitted on 10/8/2024)  If you checked off any problems, how difficult have these problems made it for you to do your work, take care of things at home, or get along with other people?: Very difficult  PHQ9 TOTAL SCORE: 15

## 2024-11-09 ENCOUNTER — HEALTH MAINTENANCE LETTER (OUTPATIENT)
Age: 39
End: 2024-11-09

## 2025-01-16 ENCOUNTER — HOSPITAL ENCOUNTER (EMERGENCY)
Facility: HOSPITAL | Age: 40
Discharge: HOME | End: 2025-01-16
Payer: COMMERCIAL

## 2025-01-16 VITALS
DIASTOLIC BLOOD PRESSURE: 117 MMHG | TEMPERATURE: 98 F | HEART RATE: 115 BPM | SYSTOLIC BLOOD PRESSURE: 154 MMHG | OXYGEN SATURATION: 98 % | RESPIRATION RATE: 18 BRPM

## 2025-01-16 DIAGNOSIS — J32.9 SINUSITIS: ICD-10-CM

## 2025-01-16 PROCEDURE — 99213 OFFICE O/P EST LOW 20 MIN: CPT

## 2025-01-16 PROCEDURE — G0463 HOSPITAL OUTPT CLINIC VISIT: HCPCS

## 2025-01-16 ASSESSMENT — ENCOUNTER SYMPTOMS
ACTIVITY CHANGE: 0
COUGH: 0
SINUS PRESSURE: 1
SINUS PAIN: 1
FEVER: 0
APPETITE CHANGE: 0
SHORTNESS OF BREATH: 0

## 2025-01-16 NOTE — ED TRIAGE NOTES
Pt presents with c/o having increased sinus pain and bilateral ear pain that started 3 days ago   And increased teeth pain and reports was sick 3 weeks ago but got better   Pt had tylenol, ibu and asprin this morning

## 2025-01-16 NOTE — DISCHARGE INSTRUCTIONS
Augmentin 2 times daily for 10 days. Yogurt or probiotic while taking.   Tylenol and ibuprofen as directed if needed.   Follow up in the clinic for a recheck.   It is important that you refill your BP medications.    Return with any new or concerning symptoms.

## 2025-01-16 NOTE — ED PROVIDER NOTES
History     Chief Complaint   Patient presents with    Sinusitis     HPI  Eugenie Aguero is a 39 year old female who presents to the urgent care with complaints of sinus pressure that radiates into teeth and ears for the last 3 days. Recently had URI symptoms with cough and congestion. She denies chest pain, shortness of breath, and fevers. No recent abx. Has taken ibuprofen with minimal improvement. Also notes that she always has elevated BP. States she is working on getting her BP medications refilled. She is not concerned about BP today.     Allergies:  Allergies   Allergen Reactions    Seasonal Allergies      Seasonal allergies    Sulfa Antibiotics     Tetanus Toxoids Unknown     Hard lump forms in arm       Problem List:    Patient Active Problem List    Diagnosis Date Noted    Female infertility 08/03/2022     Priority: Medium    Morbid obesity (H) 08/03/2022     Priority: Medium    Obesity (BMI 30-39.9) 07/20/2022     Priority: Medium    Essential hypertension, benign 05/17/2022     Priority: Medium    Tobacco abuse 09/12/2021     Priority: Medium    Elevated blood pressure reading without diagnosis of hypertension 07/13/2016     Priority: Medium    Intractable migraine without aura and with status migrainosus 07/13/2016     Priority: Medium    Anxiety 11/03/2015     Priority: Medium    Major depressive disorder, recurrent episode, moderate (H) 11/03/2015     Priority: Medium    Depression with anxiety 08/30/2013     Priority: Medium    Panic attack 08/30/2013     Priority: Medium    Family history of malignant neoplasm of breast 08/09/2007     Priority: Medium        Past Medical History:    Past Medical History:   Diagnosis Date    Anxiety     Bilateral carpal tunnel syndrome 11/3/2015    Depression with anxiety 8/30/2013    Depressive disorder     Elevated blood pressure reading without diagnosis of hypertension 7/13/2016    Essential hypertension, benign 5/17/2022    Female infertility 8/3/2022     Intractable migraine without aura and with status migrainosus 7/13/2016    Major depressive disorder, recurrent episode, moderate (H) 11/3/2015    Obesity (BMI 30-39.9) 7/20/2022    Panic attack 8/30/2013    Tobacco abuse 9/12/2021       Past Surgical History:    Past Surgical History:   Procedure Laterality Date    RELEASE CARPAL TUNNEL Right 11/4/2015    Procedure: RELEASE CARPAL TUNNEL;  Surgeon: Keith Dove MD;  Location: [facility] OR       Family History:    Family History   Problem Relation Age of Onset    Breast Cancer Mother 50    Skin Cancer Mother         unsure type    Atrial fibrillation Mother     Hypertension Mother     Kidney Disease Brother     Breast Cancer Maternal Grandmother        Social History:  Marital Status:  Single [1]  Social History     Tobacco Use    Smoking status: Every Day     Current packs/day: 0.50     Average packs/day: 0.5 packs/day for 10.0 years (5.0 ttl pk-yrs)     Types: Cigarettes     Passive exposure: Current    Smokeless tobacco: Never   Substance Use Topics    Alcohol use: No    Drug use: No        Medications:    buPROPion (WELLBUTRIN XL) 150 MG 24 hr tablet  losartan (COZAAR) 25 MG tablet  propranolol ER (INDERAL LA) 60 MG 24 hr capsule  amoxicillin-clavulanate (AUGMENTIN) 875-125 MG tablet          Review of Systems   Constitutional:  Negative for activity change, appetite change and fever.   HENT:  Positive for congestion, ear pain, sinus pressure and sinus pain.    Respiratory:  Negative for cough and shortness of breath.    Cardiovascular:  Negative for chest pain.   All other systems reviewed and are negative.      Physical Exam   BP: (!) 154/117  Pulse: 115  Temp: 98  F (36.7  C)  Resp: 18  SpO2: 98 %      Physical Exam  Vitals and nursing note reviewed.   Constitutional:       General: She is not in acute distress.     Appearance: Normal appearance. She is not ill-appearing or toxic-appearing.   HENT:      Head:      Jaw: No trismus.      Right Ear: Tympanic membrane  is bulging. Tympanic membrane is not erythematous.      Left Ear: Tympanic membrane is bulging. Tympanic membrane is not erythematous.      Nose: Congestion present.      Right Sinus: Maxillary sinus tenderness and frontal sinus tenderness present.      Left Sinus: Maxillary sinus tenderness and frontal sinus tenderness present.      Mouth/Throat:      Lips: Pink. No lesions.      Mouth: Mucous membranes are moist.      Pharynx: Oropharynx is clear. Uvula midline. No oropharyngeal exudate or posterior oropharyngeal erythema.   Cardiovascular:      Rate and Rhythm: Normal rate and regular rhythm.      Heart sounds: Normal heart sounds. No murmur heard.  Pulmonary:      Effort: Pulmonary effort is normal.      Breath sounds: Normal breath sounds. No wheezing, rhonchi or rales.   Neurological:      Mental Status: She is alert.         ED Course        Procedures      No results found for this or any previous visit (from the past 24 hours).    Medications - No data to display    Assessments & Plan (with Medical Decision Making)     I have reviewed the nursing notes.    I have reviewed the findings, diagnosis, plan and need for follow up with the patient.  Eugenie Aguero is a 39 year old female who presents to the urgent care with complaints of sinus pressure that radiates into teeth and ears for the last 3 days. Recently had URI symptoms with cough and congestion. She denies chest pain, shortness of breath, and fevers. No recent abx. Has taken ibuprofen with minimal improvement. Also notes that she always has elevated BP. States she is working on getting her BP medications refilled. She is not concerned about BP today.     MDM: BP elevated, which is baseline for Eugenie. She has not taken her antihypertensives. States she is working on getting them refilled. No chest pain or shortness of breath. Other vital signs normal. Skin pink, warm, and dry. Non toxic in appearance with no noted distress. Lungs clear, heart  tones regular. Bilateral frontal and maxillary sinus tenderness noted. Recently had URI symptoms. Most suspicion for bacterial sinusitis given length of symptoms and pan sinus tenderness. Augmentin prescribed. Discussed avoidance of sudafed with elevated BP. Stressed close follow up in clinic. Supportive measures and return precautions discussed. She is in agreement with plan.     (J32.9) Sinusitis  Plan: Augmentin 2 times daily for 10 days. Yogurt or probiotic while taking.   Tylenol and ibuprofen as directed if needed.   Follow up in the clinic for a recheck.   It is important that you refill your BP medications.    Return with any new or concerning symptoms. Understanding verbalized.       New Prescriptions    AMOXICILLIN-CLAVULANATE (AUGMENTIN) 875-125 MG TABLET    Take 1 tablet by mouth 2 times daily for 10 days.       Final diagnoses:   Sinusitis       1/16/2025   [facility]       Nelly Drummond, NP  01/16/25 [id number]

## 2025-05-27 ENCOUNTER — TELEPHONE (OUTPATIENT)
Dept: FAMILY MEDICINE | Facility: OTHER | Age: 40
End: 2025-05-27

## 2025-05-27 NOTE — TELEPHONE ENCOUNTER
2:59 PM    Reason for Call: OVERBOOK    Patient is having the following symptoms: Patient is needing to be seen  for medication. Possibly a re start or maybe a new medication. Patient doesn't want to wait until 07/02/25 days.    The patient is requesting an appointment for [facility] with Tiarra Rao.    Was an appointment offered for this call? No  If yes : Appointment type              Date    Preferred method for responding to this message: Telephone Call  What is your phone number ?  [phone]    If we cannot reach you directly, may we leave a detailed response at the number you provided? Yes    Can this message wait until your PCP/provider returns, if unavailable today? YES, Provider is in    [facility]

## 2025-05-29 NOTE — TELEPHONE ENCOUNTER
I spoke with patient today. The 1pm that you offered has already been taken. You are at least doublebooked 3x tomorrow. Do you still want to see her or offer another day and time?

## 2025-06-03 ENCOUNTER — OFFICE VISIT (OUTPATIENT)
Dept: FAMILY MEDICINE | Facility: OTHER | Age: 40
End: 2025-06-03
Attending: NURSE PRACTITIONER
Payer: COMMERCIAL

## 2025-06-03 VITALS
WEIGHT: 189.9 LBS | HEART RATE: 110 BPM | HEIGHT: 63 IN | OXYGEN SATURATION: 100 % | SYSTOLIC BLOOD PRESSURE: 150 MMHG | TEMPERATURE: 97.6 F | BODY MASS INDEX: 33.65 KG/M2 | DIASTOLIC BLOOD PRESSURE: 110 MMHG | RESPIRATION RATE: 20 BRPM

## 2025-06-03 DIAGNOSIS — G43.011 INTRACTABLE MIGRAINE WITHOUT AURA AND WITH STATUS MIGRAINOSUS: ICD-10-CM

## 2025-06-03 DIAGNOSIS — Z72.0 TOBACCO ABUSE: ICD-10-CM

## 2025-06-03 DIAGNOSIS — F33.1 MAJOR DEPRESSIVE DISORDER, RECURRENT EPISODE, MODERATE (H): ICD-10-CM

## 2025-06-03 DIAGNOSIS — Z13.220 LIPID SCREENING: ICD-10-CM

## 2025-06-03 DIAGNOSIS — F41.9 ANXIETY: ICD-10-CM

## 2025-06-03 DIAGNOSIS — I10 ESSENTIAL HYPERTENSION, BENIGN: Primary | ICD-10-CM

## 2025-06-03 DIAGNOSIS — F41.0 PANIC ATTACK: ICD-10-CM

## 2025-06-03 PROCEDURE — G0463 HOSPITAL OUTPT CLINIC VISIT: HCPCS

## 2025-06-03 RX ORDER — LOSARTAN POTASSIUM 25 MG/1
25 TABLET ORAL DAILY
Qty: 90 TABLET | Refills: 0 | Status: SHIPPED | OUTPATIENT
Start: 2025-06-03

## 2025-06-03 RX ORDER — HYDROXYZINE HYDROCHLORIDE 10 MG/1
10 TABLET, FILM COATED ORAL EVERY 8 HOURS PRN
Qty: 30 TABLET | Refills: 0 | Status: SHIPPED | OUTPATIENT
Start: 2025-06-03

## 2025-06-03 RX ORDER — ESCITALOPRAM OXALATE 10 MG/1
10 TABLET ORAL DAILY
Qty: 90 TABLET | Refills: 0 | Status: SHIPPED | OUTPATIENT
Start: 2025-06-03

## 2025-06-03 RX ORDER — PROPRANOLOL HYDROCHLORIDE 60 MG/1
60 CAPSULE, EXTENDED RELEASE ORAL DAILY
Qty: 90 CAPSULE | Refills: 0 | Status: SHIPPED | OUTPATIENT
Start: 2025-06-03

## 2025-06-03 ASSESSMENT — PAIN SCALES - GENERAL: PAINLEVEL_OUTOF10: NO PAIN (0)

## 2025-06-03 NOTE — PROGRESS NOTES
"  Assessment & Plan     (I10) Essential hypertension, benign  (primary encounter diagnosis)  Comment: blood pressure high at 150/100  Plan: Comprehensive metabolic panel (BMP + Alb, Alk Phos, ALT, AST, Total. Bili, TP)        Will restart her losartan and reassess in 4 weeks.     (G43.011) Intractable migraine without aura and with status migrainosus  Comment: worse  Plan: Will restart Propranolol and reassess in 6 months.     (F41.9) Anxiety  Comment: worse, off all medications, no thoughts of self harm  Plan: CBC with platelets and differential, escitalopram (LEXAPRO) 10 MG tablet        Will start Lexapro and reassess in 6 months.     (F33.1) Major depressive disorder, recurrent episode, moderate (H)  Comment: worse, off all medications, no thoughts of self harm  Plan: Will start Lexapro and reassess in 6 months.     (Z72.0) Tobacco abuse  Plan: Cessation encouraged.     (Z13.220) Lipid screening  Plan: Lipid Profile (Chol, Trig, HDL, LDL calc)        Will notify patient of the results when available and intervene accordingly.     (F41.0) Panic attack  Comment: happening often  Plan: hydrOXYzine HCl (ATARAX) 10 MG tablet        Will start hydroxyzine as needed.           BMI  Estimated body mass index is 33.64 kg/m  as calculated from the following:    Height as of this encounter: 1.6 m (5' 3\").    Weight as of this encounter: 86.1 kg (189 lb 14.4 oz).   Weight management plan: Discussed healthy diet and exercise guidelines    The longitudinal plan of care for the diagnosis(es)/condition(s) as documented were addressed during this visit. Due to the added complexity in care, I will continue to support Eugenie in the subsequent management and with ongoing continuity of care.    Subjective   Eugenie is a 39 year old, presenting for the following health issues:  Hypertension, Depression, and Anxiety    HPI      Hypertension Follow-up    Do you check your blood pressure regularly outside of the clinic? yes  Are " you following a low salt diet? No  Are your blood pressures ever more than 140 on the top number (systolic) OR more   than 90 on the bottom number (diastolic), for example 140/90? yes  Was taking losartan 25 mg, but stopped. Unsure why.   She does smoke.     BP Readings from Last 2 Encounters:   06/03/25 (!) 150/110   01/16/25 (!) 154/117     Depression and Anxiety   How are you doing with your depression since your last visit? Worsened   How are you doing with your anxiety since your last visit?  Worsened   Are you having other symptoms that might be associated with depression or anxiety? Yes:  having anxiety attacks in the last few weeks-currently taking no medications   Have you had a significant life event? Brother ill with kidney and bone cancer, moving   Do you have any concerns with your use of alcohol or other drugs? No  No thoughts of self harm.   Having more panic attacks.   Was taking Wellbutrin, but stopped. She is unsure why she stopped this.   Having more panic attacks.   Recently started counseling.       Migraine   Since your last clinic visit, how have your headaches changed?  No change  How often are you getting headaches or migraines? 2 times per month  Are you able to do normal daily activities when you have a migraine? Yes  Are you taking rescue/relief medications? (Select all that apply) ibuprofen (Advil, Motrin) and Excedrin  How helpful is your rescue/relief medication?  I get some relief  Are you taking any medications to prevent migraines? (Select all that apply)  was taking Inderal, but stopped; unsure why  In the past 4 weeks, how often have you gone to urgent care or the emergency room because of your headaches?  0    Social History     Tobacco Use    Smoking status: Every Day     Current packs/day: 0.50     Average packs/day: 0.5 packs/day for 10.0 years (5.0 ttl pk-yrs)     Types: Cigarettes     Passive exposure: Current    Smokeless tobacco: Never   Substance Use Topics    Alcohol  "use: No    Drug use: No         10/27/2021    11:00 AM 10/8/2024     2:41 PM 6/3/2025    11:51 AM   PHQ   PHQ-9 Total Score 6 15 20    Q9: Thoughts of better off dead/self-harm past 2 weeks Not at all Not at all Not at all       Patient-reported         10/27/2021    11:00 AM 6/3/2025    11:55 AM   TOSHA-7 SCORE   Total Score  14 (moderate anxiety)   Total Score 8 14        Patient-reported         6/3/2025    11:51 AM   Last PHQ-9   1.  Little interest or pleasure in doing things 2   2.  Feeling down, depressed, or hopeless 3   3.  Trouble falling or staying asleep, or sleeping too much 3   4.  Feeling tired or having little energy 3   5.  Poor appetite or overeating 3   6.  Feeling bad about yourself 2   7.  Trouble concentrating 2   8.  Moving slowly or restless 2   Q9: Thoughts of better off dead/self-harm past 2 weeks 0   PHQ-9 Total Score 20        Patient-reported         6/3/2025    11:55 AM   TOSHA-7    1. Feeling nervous, anxious, or on edge 2   2. Not being able to stop or control worrying 2   3. Worrying too much about different things 2   4. Trouble relaxing 2   5. Being so restless that it is hard to sit still 2   6. Becoming easily annoyed or irritable 3   7. Feeling afraid, as if something awful might happen 1   TOSHA-7 Total Score 14    If you checked any problems, how difficult have they made it for you to do your work, take care of things at home, or get along with other people? Very difficult       Patient-reported           Review of Systems  Constitutional, HEENT, cardiovascular, pulmonary, gi and gu systems are negative, except as otherwise noted.      Objective    BP (!) 150/110 (BP Location: Left arm, Patient Position: Sitting, Cuff Size: Adult Regular)   Pulse (!) 130   Temp 97.6  F (36.4  C) (Tympanic)   Resp 20   Ht 1.6 m (5' 3\")   Wt 86.1 kg (189 lb 14.4 oz)   LMP 05/29/2025   SpO2 100%   BMI 33.64 kg/m    Body mass index is 33.64 kg/m .  Physical Exam   GENERAL: alert and no " distress  EYES: Eyes grossly normal to inspection, PERRL and conjunctivae and sclerae normal  HENT: ear canals and TM's normal, nose and mouth without ulcers or lesions  NECK: no adenopathy, no asymmetry, masses, or scars  RESP: lungs clear to auscultation - no rales, rhonchi or wheezes  CV: regular rate and rhythm, no murmur, click or rub, no peripheral edema  ABDOMEN: soft, nontender, no hepatosplenomegaly, no masses and bowel sounds normal  MS: no gross musculoskeletal defects noted, no edema  NEURO: Normal strength and tone, mentation intact and speech normal  PSYCH: mentation appears normal, affect normal/bright    Labs in process.         Signed Electronically by: Tiarra Rao NP

## 2025-06-25 ENCOUNTER — HOSPITAL ENCOUNTER (EMERGENCY)
Facility: HOSPITAL | Age: 40
Discharge: HOME | End: 2025-06-25
Attending: NURSE PRACTITIONER
Payer: COMMERCIAL

## 2025-06-25 VITALS
DIASTOLIC BLOOD PRESSURE: 107 MMHG | OXYGEN SATURATION: 98 % | RESPIRATION RATE: 19 BRPM | HEART RATE: 114 BPM | SYSTOLIC BLOOD PRESSURE: 141 MMHG | TEMPERATURE: 97.3 F

## 2025-06-25 DIAGNOSIS — S05.01XA ABRASION OF RIGHT CORNEA, INITIAL ENCOUNTER: Primary | ICD-10-CM

## 2025-06-25 PROCEDURE — 250N000009 HC RX 250: Performed by: NURSE PRACTITIONER

## 2025-06-25 PROCEDURE — G0463 HOSPITAL OUTPT CLINIC VISIT: HCPCS | Performed by: NURSE PRACTITIONER

## 2025-06-25 PROCEDURE — 99213 OFFICE O/P EST LOW 20 MIN: CPT | Performed by: NURSE PRACTITIONER

## 2025-06-25 RX ORDER — ERYTHROMYCIN 5 MG/G
0.5 OINTMENT OPHTHALMIC 4 TIMES DAILY
Qty: 3.5 G | Refills: 0 | Status: SHIPPED | OUTPATIENT
Start: 2025-06-25 | End: 2025-06-30

## 2025-06-25 RX ORDER — TETRACAINE HYDROCHLORIDE 5 MG/ML
1-2 SOLUTION OPHTHALMIC ONCE
Status: COMPLETED | OUTPATIENT
Start: 2025-06-25 | End: 2025-06-25

## 2025-06-25 RX ADMIN — TETRACAINE HYDROCHLORIDE 2 DROP: 5 SOLUTION OPHTHALMIC at 17:09

## 2025-06-25 RX ADMIN — FLUORESCEIN SODIUM 1 STRIP: 1 STRIP OPHTHALMIC at 17:09

## 2025-06-25 ASSESSMENT — ENCOUNTER SYMPTOMS
EYE PAIN: 0
DIARRHEA: 0
SHORTNESS OF BREATH: 0
EYE DISCHARGE: 0
CHILLS: 0
EYE ITCHING: 1
EYE REDNESS: 1
NAUSEA: 0
VOMITING: 0
FEVER: 0
PSYCHIATRIC NEGATIVE: 1
PHOTOPHOBIA: 0

## 2025-06-25 ASSESSMENT — COLUMBIA-SUICIDE SEVERITY RATING SCALE - C-SSRS
6. HAVE YOU EVER DONE ANYTHING, STARTED TO DO ANYTHING, OR PREPARED TO DO ANYTHING TO END YOUR LIFE?: NO
1. IN THE PAST MONTH, HAVE YOU WISHED YOU WERE DEAD OR WISHED YOU COULD GO TO SLEEP AND NOT WAKE UP?: NO
2. HAVE YOU ACTUALLY HAD ANY THOUGHTS OF KILLING YOURSELF IN THE PAST MONTH?: NO

## 2025-06-25 ASSESSMENT — ACTIVITIES OF DAILY LIVING (ADL): ADLS_ACUITY_SCORE: 41

## 2025-06-25 NOTE — DISCHARGE INSTRUCTIONS
Erythromycin as ordered  Warm compresses prior to eye ointment administration  Follow up with eye doctor for further evaluation if no improvement with above  Follow up with primary care provider or return to urgent care/ED with any worsening in condition or additional concerns.

## 2025-06-25 NOTE — ED PROVIDER NOTES
History     Chief Complaint   Patient presents with    Eye Problem     HPI  Eugenie Aguero is a 39 year old female who presents to urgent care today (ambulatory) with complaints of right eye redness and irritation after piece of hair got into her eye.  Believes she got the hair out.  Denies any vision changes or photophobia.  Denies any eye injury.  Denies any vision changes.  Wears glasses, does not wear contacts.  Denies any fever, chills, nausea, vomiting, diarrhea, SOB or chest pain. No other concerns.     Allergies:  Allergies   Allergen Reactions    Seasonal Allergies      Seasonal allergies    Sulfa Antibiotics     Tetanus Toxoids Unknown     Hard lump forms in arm       Problem List:    Patient Active Problem List    Diagnosis Date Noted    Female infertility 08/03/2022     Priority: Medium    Morbid obesity (H) 08/03/2022     Priority: Medium    Obesity (BMI 30-39.9) 07/20/2022     Priority: Medium    Essential hypertension, benign 05/17/2022     Priority: Medium    Tobacco abuse 09/12/2021     Priority: Medium    Elevated blood pressure reading without diagnosis of hypertension 07/13/2016     Priority: Medium    Intractable migraine without aura and with status migrainosus 07/13/2016     Priority: Medium    Anxiety 11/03/2015     Priority: Medium    Major depressive disorder, recurrent episode, moderate (H) 11/03/2015     Priority: Medium    Depression with anxiety 08/30/2013     Priority: Medium    Panic attack 08/30/2013     Priority: Medium    Family history of malignant neoplasm of breast 08/09/2007     Priority: Medium        Past Medical History:    Past Medical History:   Diagnosis Date    Anxiety     Bilateral carpal tunnel syndrome 11/3/2015    Depression with anxiety 8/30/2013    Depressive disorder     Elevated blood pressure reading without diagnosis of hypertension 7/13/2016    Essential hypertension, benign 5/17/2022    Female infertility 8/3/2022    Intractable migraine without aura  and with status migrainosus 7/13/2016    Major depressive disorder, recurrent episode, moderate (H) 11/3/2015    Obesity (BMI 30-39.9) 7/20/2022    Panic attack 8/30/2013    Tobacco abuse 9/12/2021       Past Surgical History:    Past Surgical History:   Procedure Laterality Date    RELEASE CARPAL TUNNEL Right 11/4/2015    Procedure: RELEASE CARPAL TUNNEL;  Surgeon: Keith Dove MD;  Location: [facility]       Family History:    Family History   Problem Relation Age of Onset    Breast Cancer Mother 50    Skin Cancer Mother         unsure type    Atrial fibrillation Mother     Hypertension Mother     Kidney Disease Brother     Breast Cancer Maternal Grandmother        Social History:  Marital Status:  Single [1]  Social History     Tobacco Use    Smoking status: Every Day     Current packs/day: 0.50     Average packs/day: 0.5 packs/day for 10.0 years (5.0 ttl pk-yrs)     Types: Cigarettes     Passive exposure: Current    Smokeless tobacco: Never   Substance Use Topics    Alcohol use: No    Drug use: No        Medications:    erythromycin (ROMYCIN) 5 MG/GM ophthalmic ointment  escitalopram (LEXAPRO) 10 MG tablet  hydrOXYzine HCl (ATARAX) 10 MG tablet  losartan (COZAAR) 25 MG tablet  propranolol ER (INDERAL LA) 60 MG 24 hr capsule      Review of Systems   Constitutional:  Negative for chills and fever.   Eyes:  Positive for redness and itching. Negative for photophobia, pain, discharge and visual disturbance.   Respiratory:  Negative for shortness of breath.    Cardiovascular:  Negative for chest pain.   Gastrointestinal:  Negative for diarrhea, nausea and vomiting.   Musculoskeletal:  Negative for gait problem.   Psychiatric/Behavioral: Negative.       Physical Exam   BP: (!) 156/113  Pulse: 117  Temp: 97.3  F (36.3  C)  Resp: 19  SpO2: 98 %  Recheck  BP: 141/107  AP: 108    Physical Exam  Vitals and nursing note reviewed.   Constitutional:       General: She is not in acute distress.     Appearance: Normal appearance.  She is not ill-appearing or toxic-appearing.   HENT:      Right Ear: Tympanic membrane, ear canal and external ear normal.      Left Ear: Tympanic membrane, ear canal and external ear normal.      Nose: Nose normal.   Eyes:      General:         Right eye: No foreign body, discharge or hordeolum.      Extraocular Movements: Extraocular movements intact.      Conjunctiva/sclera:      Right eye: Right conjunctiva is injected. No chemosis, exudate or hemorrhage.     Left eye: Left conjunctiva is not injected. No chemosis, exudate or hemorrhage.     Pupils: Pupils are equal, round, and reactive to light.      Right eye: Corneal abrasion (1 to 2 mm corneal abrasion 6 o'clock position) present.   Neurological:      Mental Status: She is alert.       ED Course     6Procedures    No results found for this or any previous visit (from the past 24 hours).    Medications   tetracaine (PONTOCAINE) 0.5 % ophthalmic solution 1-2 drop (2 drops Right Eye $Given 6/25/25 1709)   fluorescein (FUL-TISHA) ophthalmic strip 1 strip (1 strip Right Eye $Given 6/25/25 1709)     Assessments & Plan (with Medical Decision Making)     I have reviewed the nursing notes.    I have reviewed the findings, diagnosis, plan and need for follow up with the patient.  (S05.01XA) Abrasion of right cornea, initial encounter  (primary encounter diagnosis)  Plan:   Patient ambulatory with a nontoxic appearance.  Patient arrived with complaints of getting a hair in her right eye, removed the hair and now has redness and irritation.  Denies vision changes or photophobia.  Does not wear contacts.  No eye drainage.  PERRL.  Tetracaine, fluorescein eye stain exam completed, conjunctiva injected, 1 to 2 mm corneal abrasion at the 6 o'clock position.  Will start patient on erythromycin eye ointment.  Follow-up with eye doctor for further evaluation if no improvement with above.  Follow-up with primary care provider or return to urgent care/ED with any worsening in  condition or additional concerns.  Patient in agreement treatment plan.    Discharge Medication List as of 6/25/2025  5:38 PM        START taking these medications    Details   erythromycin (ROMYCIN) 5 MG/GM ophthalmic ointment Place 0.5 inches into the right eye 4 times daily for 5 days.Disp-3.5 g, [id number]           Final diagnoses:   Abrasion of right cornea, initial encounter     6/25/2025   [facility]       Laquita Posada, NP  06/25/25 1800

## 2025-06-25 NOTE — ED TRIAGE NOTES
C/o eye issue    Had a hair in her right eye, states that she feels like it is out. Today     Eye is red, discharge, and lids are swolllen

## 2025-07-28 ENCOUNTER — HOSPITAL ENCOUNTER (EMERGENCY)
Facility: HOSPITAL | Age: 40
Discharge: HOME | End: 2025-07-28
Attending: PHYSICIAN ASSISTANT | Admitting: PHYSICIAN ASSISTANT
Payer: COMMERCIAL

## 2025-07-28 VITALS
RESPIRATION RATE: 18 BRPM | SYSTOLIC BLOOD PRESSURE: 172 MMHG | OXYGEN SATURATION: 100 % | WEIGHT: 182 LBS | HEART RATE: 107 BPM | TEMPERATURE: 97.3 F | HEIGHT: 63 IN | BODY MASS INDEX: 32.25 KG/M2 | DIASTOLIC BLOOD PRESSURE: 103 MMHG

## 2025-07-28 DIAGNOSIS — B35.3 TINEA PEDIS OF LEFT FOOT: Primary | ICD-10-CM

## 2025-07-28 PROCEDURE — 99212 OFFICE O/P EST SF 10 MIN: CPT | Performed by: PHYSICIAN ASSISTANT

## 2025-07-28 PROCEDURE — G0463 HOSPITAL OUTPT CLINIC VISIT: HCPCS | Performed by: PHYSICIAN ASSISTANT

## 2025-07-28 RX ORDER — PRENATAL VIT 91/IRON/FOLIC/DHA 28-975-200
COMBINATION PACKAGE (EA) ORAL 2 TIMES DAILY
Qty: 15 G | Refills: 0 | Status: SHIPPED | OUTPATIENT
Start: 2025-07-28

## 2025-07-28 ASSESSMENT — ACTIVITIES OF DAILY LIVING (ADL)
ADLS_ACUITY_SCORE: 41

## 2025-07-28 NOTE — ED NOTES
Patient discussed with provider and states she doesn't want to wait for discharge papers. Patient left.

## 2025-07-28 NOTE — ED PROVIDER NOTES
History     Chief Complaint   Patient presents with    Rash     HPI  Eugenie Aguero is a 40 year old female who presents to urgent care for evaluation of rash to bottom of left foot.  Patient states that she noticed this over this past week.  She states that it itches and that she has scaling of skin on the bottom of her foot and in between her toes.    Allergies:  Allergies   Allergen Reactions    Seasonal Allergies      Seasonal allergies    Sulfa Antibiotics     Tetanus Toxoids Unknown     Hard lump forms in arm       Problem List:    Patient Active Problem List    Diagnosis Date Noted    Female infertility 08/03/2022     Priority: Medium    Morbid obesity (H) 08/03/2022     Priority: Medium    Obesity (BMI 30-39.9) 07/20/2022     Priority: Medium    Essential hypertension, benign 05/17/2022     Priority: Medium    Tobacco abuse 09/12/2021     Priority: Medium    Elevated blood pressure reading without diagnosis of hypertension 07/13/2016     Priority: Medium    Intractable migraine without aura and with status migrainosus 07/13/2016     Priority: Medium    Anxiety 11/03/2015     Priority: Medium    Major depressive disorder, recurrent episode, moderate (H) 11/03/2015     Priority: Medium    Depression with anxiety 08/30/2013     Priority: Medium    Panic attack 08/30/2013     Priority: Medium    Family history of malignant neoplasm of breast 08/09/2007     Priority: Medium        Past Medical History:    Past Medical History:   Diagnosis Date    Anxiety     Bilateral carpal tunnel syndrome 11/3/2015    Depression with anxiety 8/30/2013    Depressive disorder     Elevated blood pressure reading without diagnosis of hypertension 7/13/2016    Essential hypertension, benign 5/17/2022    Female infertility 8/3/2022    Intractable migraine without aura and with status migrainosus 7/13/2016    Major depressive disorder, recurrent episode, moderate (H) 11/3/2015    Obesity (BMI 30-39.9) 7/20/2022    Panic attack  "8/30/2013    Tobacco abuse 9/12/2021       Past Surgical History:    Past Surgical History:   Procedure Laterality Date    RELEASE CARPAL TUNNEL Right 11/4/2015    Procedure: RELEASE CARPAL TUNNEL;  Surgeon: Keith Dove MD;  Location: [facility]       Family History:    Family History   Problem Relation Age of Onset    Breast Cancer Mother 50    Skin Cancer Mother         unsure type    Atrial fibrillation Mother     Hypertension Mother     Kidney Disease Brother     Breast Cancer Maternal Grandmother        Social History:  Marital Status:  Single [1]  Social History     Tobacco Use    Smoking status: Every Day     Current packs/day: 0.50     Average packs/day: 0.5 packs/day for 10.0 years (5.0 ttl pk-yrs)     Types: Cigarettes     Passive exposure: Current    Smokeless tobacco: Never   Substance Use Topics    Alcohol use: No    Drug use: No        Medications:    terbinafine (LAMISIL) 1 % external cream  escitalopram (LEXAPRO) 10 MG tablet  hydrOXYzine HCl (ATARAX) 10 MG tablet  losartan (COZAAR) 25 MG tablet  propranolol ER (INDERAL LA) 60 MG 24 hr capsule          Review of Systems   Skin:  Positive for rash.   All other systems reviewed and are negative.      Physical Exam   BP: (!) 172/103  Pulse: 107  Temp: 97.3  F (36.3  C)  Resp: 18  Height: 160 cm (5' 3\")  Weight: 82.6 kg (182 lb)  SpO2: 100 %      Physical Exam  Vitals and nursing note reviewed.   Constitutional:       General: She is not in acute distress.     Appearance: Normal appearance. She is not ill-appearing or toxic-appearing.   Cardiovascular:      Rate and Rhythm: Regular rhythm.      Heart sounds: Normal heart sounds.   Pulmonary:      Breath sounds: Normal breath sounds.   Musculoskeletal:        Feet:    Feet:      Comments: Scaling present on plantar aspect and heel of left foot as well as in between toes with some mild erythema.  Neurological:      Mental Status: She is alert and oriented to person, place, and time.         ED Course      "   Procedures             Critical Care time:               No results found for this or any previous visit (from the past 24 hours).    Medications - No data to display    Assessments & Plan (with Medical Decision Making)   #1.  Tinea pedis of left foot    Discussed exam findings with patient.  Proper hygiene is discussed with patient to include supportive cares.  Patient is discharged with prescription for Lamisil.  Any additional concerns patient return to urgent care/emergency department or follow-up with primary care provider.  Patient verbalized understanding and agreement to plan.      I have reviewed the nursing notes.    I have reviewed the findings, diagnosis, plan and need for follow up with the patient.            New Prescriptions    TERBINAFINE (LAMISIL) 1 % EXTERNAL CREAM    Apply topically 2 times daily.       Final diagnoses:   Tinea pedis of left foot       7/28/2025   [facility]       Daniel Arguello PA-C  07/28/25 [id number]